# Patient Record
Sex: FEMALE | Race: WHITE | Employment: UNEMPLOYED | URBAN - METROPOLITAN AREA
[De-identification: names, ages, dates, MRNs, and addresses within clinical notes are randomized per-mention and may not be internally consistent; named-entity substitution may affect disease eponyms.]

---

## 2021-03-26 LAB — PAP SMEAR, EXTERNAL: NORMAL

## 2022-07-06 LAB — MAMMOGRAPHY, EXTERNAL: NORMAL

## 2023-11-10 SDOH — HEALTH STABILITY: PHYSICAL HEALTH: ON AVERAGE, HOW MANY MINUTES DO YOU ENGAGE IN EXERCISE AT THIS LEVEL?: 60 MIN

## 2023-11-10 SDOH — HEALTH STABILITY: PHYSICAL HEALTH: ON AVERAGE, HOW MANY DAYS PER WEEK DO YOU ENGAGE IN MODERATE TO STRENUOUS EXERCISE (LIKE A BRISK WALK)?: 7 DAYS

## 2023-11-13 ENCOUNTER — OFFICE VISIT (OUTPATIENT)
Dept: FAMILY MEDICINE CLINIC | Age: 48
End: 2023-11-13
Payer: COMMERCIAL

## 2023-11-13 VITALS
WEIGHT: 138.2 LBS | HEIGHT: 66 IN | BODY MASS INDEX: 22.21 KG/M2 | RESPIRATION RATE: 18 BRPM | SYSTOLIC BLOOD PRESSURE: 110 MMHG | OXYGEN SATURATION: 98 % | HEART RATE: 69 BPM | DIASTOLIC BLOOD PRESSURE: 72 MMHG

## 2023-11-13 DIAGNOSIS — Z00.00 ANNUAL PHYSICAL EXAM: Primary | ICD-10-CM

## 2023-11-13 DIAGNOSIS — Z12.11 COLON CANCER SCREENING: ICD-10-CM

## 2023-11-13 PROCEDURE — 90471 IMMUNIZATION ADMIN: CPT | Performed by: PHYSICIAN ASSISTANT

## 2023-11-13 PROCEDURE — 90674 CCIIV4 VAC NO PRSV 0.5 ML IM: CPT | Performed by: PHYSICIAN ASSISTANT

## 2023-11-13 PROCEDURE — 99386 PREV VISIT NEW AGE 40-64: CPT | Performed by: PHYSICIAN ASSISTANT

## 2023-11-13 RX ORDER — LEVOCETIRIZINE DIHYDROCHLORIDE 5 MG/1
2.5 TABLET, FILM COATED ORAL NIGHTLY
COMMUNITY

## 2023-11-13 RX ORDER — M-VIT,TX,IRON,MINS/CALC/FOLIC 27MG-0.4MG
1 TABLET ORAL DAILY
COMMUNITY

## 2023-11-13 SDOH — ECONOMIC STABILITY: FOOD INSECURITY: WITHIN THE PAST 12 MONTHS, YOU WORRIED THAT YOUR FOOD WOULD RUN OUT BEFORE YOU GOT MONEY TO BUY MORE.: NEVER TRUE

## 2023-11-13 SDOH — ECONOMIC STABILITY: INCOME INSECURITY: HOW HARD IS IT FOR YOU TO PAY FOR THE VERY BASICS LIKE FOOD, HOUSING, MEDICAL CARE, AND HEATING?: NOT VERY HARD

## 2023-11-13 SDOH — ECONOMIC STABILITY: FOOD INSECURITY: WITHIN THE PAST 12 MONTHS, THE FOOD YOU BOUGHT JUST DIDN'T LAST AND YOU DIDN'T HAVE MONEY TO GET MORE.: NEVER TRUE

## 2023-11-13 SDOH — ECONOMIC STABILITY: HOUSING INSECURITY
IN THE LAST 12 MONTHS, WAS THERE A TIME WHEN YOU DID NOT HAVE A STEADY PLACE TO SLEEP OR SLEPT IN A SHELTER (INCLUDING NOW)?: NO

## 2023-11-13 ASSESSMENT — PATIENT HEALTH QUESTIONNAIRE - PHQ9
SUM OF ALL RESPONSES TO PHQ9 QUESTIONS 1 & 2: 0
10. IF YOU CHECKED OFF ANY PROBLEMS, HOW DIFFICULT HAVE THESE PROBLEMS MADE IT FOR YOU TO DO YOUR WORK, TAKE CARE OF THINGS AT HOME, OR GET ALONG WITH OTHER PEOPLE: 0
7. TROUBLE CONCENTRATING ON THINGS, SUCH AS READING THE NEWSPAPER OR WATCHING TELEVISION: 0
4. FEELING TIRED OR HAVING LITTLE ENERGY: 0
3. TROUBLE FALLING OR STAYING ASLEEP: 0
6. FEELING BAD ABOUT YOURSELF - OR THAT YOU ARE A FAILURE OR HAVE LET YOURSELF OR YOUR FAMILY DOWN: 0
SUM OF ALL RESPONSES TO PHQ QUESTIONS 1-9: 0
1. LITTLE INTEREST OR PLEASURE IN DOING THINGS: 0
8. MOVING OR SPEAKING SO SLOWLY THAT OTHER PEOPLE COULD HAVE NOTICED. OR THE OPPOSITE, BEING SO FIGETY OR RESTLESS THAT YOU HAVE BEEN MOVING AROUND A LOT MORE THAN USUAL: 0
9. THOUGHTS THAT YOU WOULD BE BETTER OFF DEAD, OR OF HURTING YOURSELF: 0
SUM OF ALL RESPONSES TO PHQ QUESTIONS 1-9: 0
5. POOR APPETITE OR OVEREATING: 0
SUM OF ALL RESPONSES TO PHQ QUESTIONS 1-9: 0
SUM OF ALL RESPONSES TO PHQ QUESTIONS 1-9: 0
2. FEELING DOWN, DEPRESSED OR HOPELESS: 0

## 2023-11-13 NOTE — PROGRESS NOTES
range of motion, no synovitis. She exhibits no edema. Neurological: She is alert and oriented to person, place, and time. She has normal reflexes. No cranial nerve deficit. Coordination normal.   Skin: Skin is warm and dry. There is no rash or erythema. No suspicious lesions noted. Psychiatric: She has a normal mood and affect. Her speech is normal and behavior is normal. Judgment, cognition and memory are normal.           Assessment/Plan:         Diagnosis Orders   1. Annual physical exam  CBC    Lipid Panel    Comprehensive Metabolic Panel    Hemoglobin A1C      2.  Colon cancer screening  Fecal DNA Colorectal cancer screening (Cologuard)

## 2023-11-17 ENCOUNTER — NURSE ONLY (OUTPATIENT)
Dept: FAMILY MEDICINE CLINIC | Age: 48
End: 2023-11-17
Payer: COMMERCIAL

## 2023-11-17 DIAGNOSIS — Z00.00 ANNUAL PHYSICAL EXAM: ICD-10-CM

## 2023-11-17 LAB
ALBUMIN SERPL-MCNC: 4.7 G/DL (ref 3.4–5)
ALBUMIN/GLOB SERPL: 2.2 {RATIO} (ref 1.1–2.2)
ALP SERPL-CCNC: 57 U/L (ref 40–129)
ALT SERPL-CCNC: 21 U/L (ref 10–40)
ANION GAP SERPL CALCULATED.3IONS-SCNC: 10 MMOL/L (ref 3–16)
AST SERPL-CCNC: 21 U/L (ref 15–37)
BILIRUB SERPL-MCNC: 0.5 MG/DL (ref 0–1)
BUN SERPL-MCNC: 23 MG/DL (ref 7–20)
CALCIUM SERPL-MCNC: 9.7 MG/DL (ref 8.3–10.6)
CHLORIDE SERPL-SCNC: 103 MMOL/L (ref 99–110)
CHOLEST SERPL-MCNC: 162 MG/DL (ref 0–199)
CO2 SERPL-SCNC: 27 MMOL/L (ref 21–32)
CREAT SERPL-MCNC: 0.6 MG/DL (ref 0.6–1.1)
DEPRECATED RDW RBC AUTO: 13.8 % (ref 12.4–15.4)
GFR SERPLBLD CREATININE-BSD FMLA CKD-EPI: >60 ML/MIN/{1.73_M2}
GLUCOSE SERPL-MCNC: 92 MG/DL (ref 70–99)
HCT VFR BLD AUTO: 39.2 % (ref 36–48)
HDLC SERPL-MCNC: 87 MG/DL (ref 40–60)
HGB BLD-MCNC: 13 G/DL (ref 12–16)
LDLC SERPL CALC-MCNC: 69 MG/DL
MCH RBC QN AUTO: 31.7 PG (ref 26–34)
MCHC RBC AUTO-ENTMCNC: 33.3 G/DL (ref 31–36)
MCV RBC AUTO: 95.4 FL (ref 80–100)
PLATELET # BLD AUTO: 285 K/UL (ref 135–450)
PMV BLD AUTO: 8.8 FL (ref 5–10.5)
POTASSIUM SERPL-SCNC: 4.3 MMOL/L (ref 3.5–5.1)
PROT SERPL-MCNC: 6.8 G/DL (ref 6.4–8.2)
RBC # BLD AUTO: 4.11 M/UL (ref 4–5.2)
SODIUM SERPL-SCNC: 140 MMOL/L (ref 136–145)
TRIGL SERPL-MCNC: 32 MG/DL (ref 0–150)
VLDLC SERPL CALC-MCNC: 6 MG/DL
WBC # BLD AUTO: 4.5 K/UL (ref 4–11)

## 2023-11-17 PROCEDURE — 36415 COLL VENOUS BLD VENIPUNCTURE: CPT | Performed by: PHYSICIAN ASSISTANT

## 2023-11-18 LAB
EST. AVERAGE GLUCOSE BLD GHB EST-MCNC: 111.2 MG/DL
HBA1C MFR BLD: 5.5 %

## 2023-12-06 LAB — NONINV COLON CA DNA+OCC BLD SCRN STL QL: NEGATIVE

## 2023-12-27 ENCOUNTER — OFFICE VISIT (OUTPATIENT)
Dept: FAMILY MEDICINE CLINIC | Age: 48
End: 2023-12-27
Payer: COMMERCIAL

## 2023-12-27 VITALS
TEMPERATURE: 98 F | WEIGHT: 140.2 LBS | SYSTOLIC BLOOD PRESSURE: 106 MMHG | OXYGEN SATURATION: 98 % | DIASTOLIC BLOOD PRESSURE: 58 MMHG | RESPIRATION RATE: 16 BRPM | HEART RATE: 83 BPM | HEIGHT: 66 IN | BODY MASS INDEX: 22.53 KG/M2

## 2023-12-27 DIAGNOSIS — B96.89 ACUTE BACTERIAL SINUSITIS: Primary | ICD-10-CM

## 2023-12-27 DIAGNOSIS — J01.90 ACUTE BACTERIAL SINUSITIS: Primary | ICD-10-CM

## 2023-12-27 DIAGNOSIS — H05.222: ICD-10-CM

## 2023-12-27 PROCEDURE — 99213 OFFICE O/P EST LOW 20 MIN: CPT | Performed by: PHYSICIAN ASSISTANT

## 2023-12-27 RX ORDER — AMOXICILLIN AND CLAVULANATE POTASSIUM 875; 125 MG/1; MG/1
1 TABLET, FILM COATED ORAL 2 TIMES DAILY
Qty: 14 TABLET | Refills: 0 | Status: SHIPPED | OUTPATIENT
Start: 2023-12-27 | End: 2024-01-03

## 2023-12-27 RX ORDER — MOXIFLOXACIN 5 MG/ML
1 SOLUTION/ DROPS OPHTHALMIC 3 TIMES DAILY
Qty: 1 EACH | Refills: 0 | Status: SHIPPED | OUTPATIENT
Start: 2023-12-27 | End: 2024-01-03

## 2024-01-03 ENCOUNTER — OFFICE VISIT (OUTPATIENT)
Dept: OBGYN CLINIC | Age: 49
End: 2024-01-03
Payer: COMMERCIAL

## 2024-01-03 VITALS
HEIGHT: 66 IN | SYSTOLIC BLOOD PRESSURE: 119 MMHG | TEMPERATURE: 97.8 F | WEIGHT: 140.6 LBS | BODY MASS INDEX: 22.6 KG/M2 | OXYGEN SATURATION: 98 % | DIASTOLIC BLOOD PRESSURE: 68 MMHG | HEART RATE: 78 BPM

## 2024-01-03 DIAGNOSIS — Z12.4 SCREENING FOR CERVICAL CANCER: ICD-10-CM

## 2024-01-03 DIAGNOSIS — N63.14 MASS OF LOWER INNER QUADRANT OF RIGHT BREAST: ICD-10-CM

## 2024-01-03 DIAGNOSIS — Z01.419 WOMEN'S ANNUAL ROUTINE GYNECOLOGICAL EXAMINATION: Primary | ICD-10-CM

## 2024-01-03 PROCEDURE — 99396 PREV VISIT EST AGE 40-64: CPT | Performed by: OBSTETRICS & GYNECOLOGY

## 2024-01-03 ASSESSMENT — ENCOUNTER SYMPTOMS
BACK PAIN: 0
BLOOD IN STOOL: 0
DIARRHEA: 0
CONSTIPATION: 0

## 2024-01-03 NOTE — PROGRESS NOTES
[facility] Ob/Gyn   Annual Exam      CC:   Chief Complaint   Patient presents with    New Patient     New Patient: Establish care: Last pap . No hx of abnormal        HPI:  48 y.o.  LMP 2023 presents for her gynecologic annual exam. She reports that she had four cycles over the past year. She reports that she is having hot flashes approximately 40 times a day as well as occasional night sweats.     Primary Care Physician: Beba Juarez PA    Obstetric History  OB History    Para Term  AB Living   2 2 2     2   SAB IAB Ectopic Molar Multiple Live Births             2      # Outcome Date GA Lbr Kayode/2nd Weight Sex Delivery Anes PTL Lv   2 Term      Vag-Spont   JESSICA   1 Term      Vag-Spont   JESSICA       Gynecologic History  Menstrual History:  Menarche: 9   LMP: 2025  Menstrual Period: irregular  Interval Between Menses: ranges   Duration of Menses: five to seven days   Menstrual Flow: normal   Bleeding between menses: denies   Pain: denies       Screening:  Last pap smear: ASCUS in   History of abnormal pap smears: denies   STI hx: denies   Mammogram:   Colonoscopy: cologuard    Medical History:  Past Medical History:   Diagnosis Date    Allergic rhinitis     Anxiety     Mild situational       Surgical History:  No past surgical history on file.    Medications:  Current Outpatient Medications   Medication Sig Dispense Refill    levocetirizine (XYZAL ALLERGY 24HR) 5 MG tablet Take 0.5 tablets by mouth nightly      Multiple Vitamins-Minerals (THERAPEUTIC MULTIVITAMIN-MINERALS) tablet Take 1 tablet by mouth daily       No current facility-administered medications for this visit.       Allergies:  Allergies   Allergen Reactions    Codeine Other (See Comments)    Tetracycline Diarrhea, Hives and Nausea And Vomiting       Family History:  Family History   Problem Relation Age of Onset    Asthma Mother     Breast Cancer Mother     Allergy (Severe) Father     Hearing Loss Father

## 2024-01-04 LAB — HPV16+18+H RISK 12 DNA SPEC-IMP: NORMAL

## 2024-01-10 LAB
HPV HR 12 DNA SPEC QL NAA+PROBE: NOT DETECTED
HPV16 DNA SPEC QL NAA+PROBE: NOT DETECTED
HPV16+18+H RISK 12 DNA SPEC-IMP: NORMAL
HPV18 DNA SPEC QL NAA+PROBE: NOT DETECTED

## 2024-01-30 ENCOUNTER — HOSPITAL ENCOUNTER (OUTPATIENT)
Dept: WOMENS IMAGING | Age: 49
Discharge: HOME | End: 2024-01-30
Payer: COMMERCIAL

## 2024-01-30 VITALS — WEIGHT: 138 LBS | BODY MASS INDEX: 22.18 KG/M2 | HEIGHT: 66 IN

## 2024-01-30 DIAGNOSIS — N63.14 MASS OF LOWER INNER QUADRANT OF RIGHT BREAST: ICD-10-CM

## 2024-01-30 PROCEDURE — G0279 TOMOSYNTHESIS, MAMMO: HCPCS

## 2024-01-30 PROCEDURE — 76642 ULTRASOUND BREAST LIMITED: CPT

## 2024-04-03 ENCOUNTER — PATIENT MESSAGE (OUTPATIENT)
Dept: FAMILY MEDICINE CLINIC | Age: 49
End: 2024-04-03

## 2024-04-03 NOTE — TELEPHONE ENCOUNTER
From: Shraddha Hanson  To: Beba Juarez  Sent: 4/3/2024 8:03 AM EDT  Subject: Appointment Request    Appointment Request From: Shraddha Hanson    With Provider: KT Guerrero [[facility]]    Preferred Date Range: 4/3/2024 – 4/3/2024    Preferred Times: Wednesday Morning, Wednesday Afternoon    Reason for visit: Office Visit    Comments:  Stye that won’t go away. Need antibiotic. Can’t do after 3:30 today.

## 2024-04-04 ENCOUNTER — OFFICE VISIT (OUTPATIENT)
Dept: FAMILY MEDICINE CLINIC | Age: 49
End: 2024-04-04
Payer: COMMERCIAL

## 2024-04-04 VITALS
RESPIRATION RATE: 16 BRPM | SYSTOLIC BLOOD PRESSURE: 98 MMHG | BODY MASS INDEX: 22.24 KG/M2 | HEART RATE: 82 BPM | OXYGEN SATURATION: 98 % | WEIGHT: 138.4 LBS | HEIGHT: 66 IN | DIASTOLIC BLOOD PRESSURE: 60 MMHG | TEMPERATURE: 97.6 F

## 2024-04-04 DIAGNOSIS — G47.01 INSOMNIA DUE TO MEDICAL CONDITION: ICD-10-CM

## 2024-04-04 DIAGNOSIS — H00.021 HORDEOLUM INTERNUM OF RIGHT UPPER EYELID: Primary | ICD-10-CM

## 2024-04-04 PROBLEM — E55.9 VITAMIN D DEFICIENCY: Status: ACTIVE | Noted: 2021-06-30

## 2024-04-04 PROCEDURE — 99214 OFFICE O/P EST MOD 30 MIN: CPT | Performed by: PHYSICIAN ASSISTANT

## 2024-04-04 RX ORDER — ERYTHROMYCIN 5 MG/G
1 OINTMENT OPHTHALMIC
Qty: 1 EACH | Refills: 0 | Status: SHIPPED | OUTPATIENT
Start: 2024-04-04 | End: 2024-04-11

## 2024-04-04 RX ORDER — HYDROXYZINE PAMOATE 50 MG/1
50 CAPSULE ORAL 2 TIMES DAILY PRN
Qty: 15 CAPSULE | Refills: 0 | Status: SHIPPED | OUTPATIENT
Start: 2024-04-04

## 2024-04-04 ASSESSMENT — PATIENT HEALTH QUESTIONNAIRE - PHQ9
2. FEELING DOWN, DEPRESSED OR HOPELESS: NOT AT ALL
SUM OF ALL RESPONSES TO PHQ QUESTIONS 1-9: 0
SUM OF ALL RESPONSES TO PHQ9 QUESTIONS 1 & 2: 0
SUM OF ALL RESPONSES TO PHQ9 QUESTIONS 1 & 2: 0
1. LITTLE INTEREST OR PLEASURE IN DOING THINGS: NOT AT ALL
SUM OF ALL RESPONSES TO PHQ QUESTIONS 1-9: 0
1. LITTLE INTEREST OR PLEASURE IN DOING THINGS: NOT AT ALL
2. FEELING DOWN, DEPRESSED OR HOPELESS: NOT AT ALL

## 2024-04-04 NOTE — PROGRESS NOTES
[facility] MEDICINE  04/04/24       IMPRESSION/ PLAN     Hordeolum internum of right upper eyelid  -Right upper outer lid stye.  Begin erythromycin ointment x 7 days, warm compresses, avoid contact use until resolved.    Insomnia due to medical condition  -Under excess stressors would be interested in SSRI.  Has already seen a therapist  -Vistaril 50 mg nightly as needed, do not take with Xyzal.  Follow-up in 3 weeks with PCP to discuss daily treatment for depression/anxiety. Would be interested in SSRI use.        CHIEF COMPLAINT  Chief Complaint   Patient presents with    Stye     Pt is here with a stye on her right eye     HISTORY OF PRESENT  ILLNESS  Shraddha Hanson is a 48 y.o.  female   began with an erythemic bulge upper outer right eyelid 10 days ago unresolved despite warm compresses.  Has had them in the past.  Wears contacts.  No other respiratory symptoms.    Also under excess stressors and having more insomnia.  Tried melatonin but no longer working.  Has seen therapist in the past.  Would be interested in beginning daily SSRI.    ROS:  Remaining reviewed and are unremarkable for other constitutional, EENT, cardiac, pulmonary, GI, , neurologic, musculoskeletal, or integumentary complaints.      PAST MEDICAL/SURGICAL, SOCIAL, &  FAMILY HISTORY:  Reviewed and updated accordingly.     ALLERGIES : Codeine and Tetracycline    MEDICATIONS:  Current Outpatient Medications on File Prior to Visit   Medication Sig Dispense Refill    levocetirizine (XYZAL ALLERGY 24HR) 5 MG tablet Take 0.5 tablets by mouth nightly      Multiple Vitamins-Minerals (THERAPEUTIC MULTIVITAMIN-MINERALS) tablet Take 1 tablet by mouth daily       No current facility-administered medications on file prior to visit.        PHYSICAL EXAM     Vitals:    04/04/24 0800   BP: 98/60   Pulse: 82   Resp: 16   Temp: 97.6 °F (36.4 °C)   TempSrc: Temporal   SpO2: 98%   Weight: 62.8 kg (138 lb 6.4 oz)   Height: 1.676 m (5' 6\")

## 2025-02-05 ENCOUNTER — OFFICE VISIT (OUTPATIENT)
Dept: FAMILY MEDICINE CLINIC | Age: 50
End: 2025-02-05

## 2025-02-05 ENCOUNTER — PATIENT MESSAGE (OUTPATIENT)
Dept: FAMILY MEDICINE CLINIC | Age: 50
End: 2025-02-05

## 2025-02-05 VITALS
SYSTOLIC BLOOD PRESSURE: 102 MMHG | BODY MASS INDEX: 21.08 KG/M2 | WEIGHT: 131.2 LBS | RESPIRATION RATE: 14 BRPM | HEART RATE: 99 BPM | HEIGHT: 66 IN | TEMPERATURE: 98.2 F | DIASTOLIC BLOOD PRESSURE: 60 MMHG | OXYGEN SATURATION: 100 %

## 2025-02-05 DIAGNOSIS — R39.15 URGENCY OF URINATION: ICD-10-CM

## 2025-02-05 DIAGNOSIS — R30.0 DYSURIA: ICD-10-CM

## 2025-02-05 DIAGNOSIS — N39.0 RECURRENT UTI: Primary | ICD-10-CM

## 2025-02-05 LAB
BILIRUBIN, POC: NORMAL
BLOOD URINE, POC: NORMAL
CLARITY, POC: CLEAR
COLOR, POC: NORMAL
GLUCOSE URINE, POC: 100 MG/DL
KETONES, POC: 40 MG/DL
LEUKOCYTE EST, POC: NEGATIVE
NITRITE, POC: POSITIVE
PH, POC: 5
PROTEIN, POC: NEGATIVE MG/DL
SPECIFIC GRAVITY, POC: 1.02
UROBILINOGEN, POC: 1 MG/DL

## 2025-02-05 RX ORDER — PHENAZOPYRIDINE HYDROCHLORIDE 100 MG/1
100 TABLET, FILM COATED ORAL 3 TIMES DAILY PRN
Qty: 6 TABLET | Refills: 0 | Status: SHIPPED | OUTPATIENT
Start: 2025-02-05

## 2025-02-05 RX ORDER — CIPROFLOXACIN 500 MG/1
500 TABLET, FILM COATED ORAL 2 TIMES DAILY
Qty: 10 TABLET | Refills: 0 | Status: SHIPPED | OUTPATIENT
Start: 2025-02-05 | End: 2025-02-10

## 2025-02-05 SDOH — ECONOMIC STABILITY: FOOD INSECURITY: WITHIN THE PAST 12 MONTHS, THE FOOD YOU BOUGHT JUST DIDN'T LAST AND YOU DIDN'T HAVE MONEY TO GET MORE.: NEVER TRUE

## 2025-02-05 SDOH — ECONOMIC STABILITY: FOOD INSECURITY: WITHIN THE PAST 12 MONTHS, YOU WORRIED THAT YOUR FOOD WOULD RUN OUT BEFORE YOU GOT MONEY TO BUY MORE.: NEVER TRUE

## 2025-02-05 ASSESSMENT — PATIENT HEALTH QUESTIONNAIRE - PHQ9
2. FEELING DOWN, DEPRESSED OR HOPELESS: NOT AT ALL
10. IF YOU CHECKED OFF ANY PROBLEMS, HOW DIFFICULT HAVE THESE PROBLEMS MADE IT FOR YOU TO DO YOUR WORK, TAKE CARE OF THINGS AT HOME, OR GET ALONG WITH OTHER PEOPLE: NOT DIFFICULT AT ALL
SUM OF ALL RESPONSES TO PHQ QUESTIONS 1-9: 0
6. FEELING BAD ABOUT YOURSELF - OR THAT YOU ARE A FAILURE OR HAVE LET YOURSELF OR YOUR FAMILY DOWN: NOT AT ALL
1. LITTLE INTEREST OR PLEASURE IN DOING THINGS: NOT AT ALL
5. POOR APPETITE OR OVEREATING: NOT AT ALL
9. THOUGHTS THAT YOU WOULD BE BETTER OFF DEAD, OR OF HURTING YOURSELF: NOT AT ALL
SUM OF ALL RESPONSES TO PHQ9 QUESTIONS 1 & 2: 0
3. TROUBLE FALLING OR STAYING ASLEEP: NOT AT ALL
7. TROUBLE CONCENTRATING ON THINGS, SUCH AS READING THE NEWSPAPER OR WATCHING TELEVISION: NOT AT ALL
SUM OF ALL RESPONSES TO PHQ QUESTIONS 1-9: 0
8. MOVING OR SPEAKING SO SLOWLY THAT OTHER PEOPLE COULD HAVE NOTICED. OR THE OPPOSITE, BEING SO FIGETY OR RESTLESS THAT YOU HAVE BEEN MOVING AROUND A LOT MORE THAN USUAL: NOT AT ALL
4. FEELING TIRED OR HAVING LITTLE ENERGY: NOT AT ALL

## 2025-02-05 NOTE — PROGRESS NOTES
[facility]    CC:   Chief Complaint   Patient presents with    Dysuria     Pelvic pain, urgency x 11 days       History of Present Illness:    Shraddha Hanson is a 49 y.o. female who complains of 10 days of  dysuria, frequency, urgency, burning with urination, and not feeling well.   Telehealth appointment prescribed Bactrim x 10 days completed yesterday. Still using OTC azo.   Last  UTI- 2 years.       Denies:  Fever, chills, flank or suprapubic pains. No nausea. Appetite unchanged.   Denies GYN changes.   Denies bowel habit changes, melena, hematochezia.      Physical exam:      Vitals:    02/05/25 1403   BP: 102/60   Pulse: 99   Resp: 14   Temp: 98.2 °F (36.8 °C)   TempSrc: Temporal   SpO2: 100%   Weight: 59.5 kg (131 lb 3.2 oz)   Height: 1.676 m (5' 6\")     APPEARANCE:    No acute distress.looks like she does not feel well.  HEART: Reg rate and rhythm. No murmurs, rubs, or gallops.   LUNGS: Clear to auscultation. No wheezes, rales, or rhonchi.  ABDOMEN:  Soft, bowel sounds present,  non-tender, no masses or HSM. No CVAT.  NEUROLOGIC: grossly non focal  SKIN: Warm, dry, normal turgor. Cap refill <3secs.  No rashes, petechiae, purpura.     Laboratory:   Results for orders placed or performed in visit on 02/05/25   POCT Urinalysis no Micro   Result Value Ref Range    Color, UA orange     Clarity, UA clear     Glucose, UA  mg/dL    Bilirubin, UA Small     Ketones, UA 40 mg/dL    Spec Grav, UA 1.025     Blood, UA POC Trace-intact     pH, UA 5.0     Protein, UA POC Negative mg/dL    Urobilinogen, UA 1.0 mg/dL    Leukocytes, UA Negative     Nitrite, UA Positive       Urine culture will defer as just completed antibiotic.     Assessment/ Plan      1. Recurrent UTI    2. Dysuria    3. Urgency of urination     Refractory to Bactrim. Start Cipro x 5d.   Use prescribed Pyridium over Azo.  Increase fluids.   RTO 1 week    Orders Placed This Encounter   Medications    ciprofloxacin (CIPRO)

## 2025-02-08 ENCOUNTER — HOSPITAL ENCOUNTER (INPATIENT)
Age: 50
LOS: 1 days | Discharge: HOME | DRG: 690 | End: 2025-02-09
Attending: STUDENT IN AN ORGANIZED HEALTH CARE EDUCATION/TRAINING PROGRAM | Admitting: INTERNAL MEDICINE
Payer: COMMERCIAL

## 2025-02-08 DIAGNOSIS — N39.0 URINARY TRACT INFECTION WITHOUT HEMATURIA, SITE UNSPECIFIED: Primary | ICD-10-CM

## 2025-02-08 LAB
ALBUMIN SERPL-MCNC: 4.4 G/DL (ref 3.4–5)
ANION GAP SERPL CALCULATED.3IONS-SCNC: 13 MMOL/L (ref 3–16)
BACTERIA URNS QL MICRO: ABNORMAL /HPF
BASOPHILS # BLD: 0 K/UL (ref 0–0.2)
BASOPHILS NFR BLD: 0.6 %
BILIRUB UR QL STRIP.AUTO: ABNORMAL
BUN SERPL-MCNC: 17 MG/DL (ref 7–20)
CALCIUM SERPL-MCNC: 9.4 MG/DL (ref 8.3–10.6)
CHLORIDE SERPL-SCNC: 101 MMOL/L (ref 99–110)
CLARITY UR: CLEAR
CO2 SERPL-SCNC: 22 MMOL/L (ref 21–32)
COLOR UR: ABNORMAL
CREAT SERPL-MCNC: 0.6 MG/DL (ref 0.6–1.1)
DEPRECATED RDW RBC AUTO: 13.1 % (ref 12.4–15.4)
EOSINOPHIL # BLD: 0 K/UL (ref 0–0.6)
EOSINOPHIL NFR BLD: 0.9 %
EPI CELLS #/AREA URNS HPF: ABNORMAL /HPF (ref 0–5)
GFR SERPLBLD CREATININE-BSD FMLA CKD-EPI: >90 ML/MIN/{1.73_M2}
GLUCOSE SERPL-MCNC: 106 MG/DL (ref 70–99)
GLUCOSE UR STRIP.AUTO-MCNC: 100 MG/DL
HCT VFR BLD AUTO: 37.3 % (ref 36–48)
HGB BLD-MCNC: 12.2 G/DL (ref 12–16)
HGB UR QL STRIP.AUTO: NEGATIVE
KETONES UR STRIP.AUTO-MCNC: >=80 MG/DL
LEUKOCYTE ESTERASE UR QL STRIP.AUTO: NEGATIVE
LYMPHOCYTES # BLD: 1.2 K/UL (ref 1–5.1)
LYMPHOCYTES NFR BLD: 21.6 %
MCH RBC QN AUTO: 32.1 PG (ref 26–34)
MCHC RBC AUTO-ENTMCNC: 32.6 G/DL (ref 31–36)
MCV RBC AUTO: 98.4 FL (ref 80–100)
MONOCYTES # BLD: 0.3 K/UL (ref 0–1.3)
MONOCYTES NFR BLD: 6.3 %
NEUTROPHILS # BLD: 3.8 K/UL (ref 1.7–7.7)
NEUTROPHILS NFR BLD: 70.6 %
NITRITE UR QL STRIP.AUTO: POSITIVE
PH UR STRIP.AUTO: 5.5 [PH] (ref 5–8)
PHOSPHATE SERPL-MCNC: 3 MG/DL (ref 2.5–4.9)
PLATELET # BLD AUTO: 293 K/UL (ref 135–450)
PMV BLD AUTO: 7.6 FL (ref 5–10.5)
POTASSIUM SERPL-SCNC: 3.7 MMOL/L (ref 3.5–5.1)
PROT UR STRIP.AUTO-MCNC: 30 MG/DL
RBC # BLD AUTO: 3.79 M/UL (ref 4–5.2)
RBC #/AREA URNS HPF: ABNORMAL /HPF (ref 0–4)
SODIUM SERPL-SCNC: 136 MMOL/L (ref 136–145)
SP GR UR STRIP.AUTO: 1.02 (ref 1–1.03)
UA DIPSTICK W REFLEX MICRO PNL UR: YES
URN SPEC COLLECT METH UR: ABNORMAL
UROBILINOGEN UR STRIP-ACNC: 4 E.U./DL
WBC # BLD AUTO: 5.4 K/UL (ref 4–11)
WBC #/AREA URNS HPF: ABNORMAL /HPF (ref 0–5)

## 2025-02-08 PROCEDURE — 6360000002 HC RX W HCPCS: Performed by: STUDENT IN AN ORGANIZED HEALTH CARE EDUCATION/TRAINING PROGRAM

## 2025-02-08 PROCEDURE — 96375 TX/PRO/DX INJ NEW DRUG ADDON: CPT

## 2025-02-08 PROCEDURE — 2500000003 HC RX 250 WO HCPCS: Performed by: INTERNAL MEDICINE

## 2025-02-08 PROCEDURE — 81001 URINALYSIS AUTO W/SCOPE: CPT

## 2025-02-08 PROCEDURE — 96374 THER/PROPH/DIAG INJ IV PUSH: CPT

## 2025-02-08 PROCEDURE — 80069 RENAL FUNCTION PANEL: CPT

## 2025-02-08 PROCEDURE — 2580000003 HC RX 258: Performed by: INTERNAL MEDICINE

## 2025-02-08 PROCEDURE — 2580000003 HC RX 258: Performed by: STUDENT IN AN ORGANIZED HEALTH CARE EDUCATION/TRAINING PROGRAM

## 2025-02-08 PROCEDURE — 99285 EMERGENCY DEPT VISIT HI MDM: CPT

## 2025-02-08 PROCEDURE — 85025 COMPLETE CBC W/AUTO DIFF WBC: CPT

## 2025-02-08 PROCEDURE — 1200000000 HC SEMI PRIVATE

## 2025-02-08 PROCEDURE — 2500000003 HC RX 250 WO HCPCS: Performed by: STUDENT IN AN ORGANIZED HEALTH CARE EDUCATION/TRAINING PROGRAM

## 2025-02-08 PROCEDURE — 96361 HYDRATE IV INFUSION ADD-ON: CPT

## 2025-02-08 RX ORDER — POTASSIUM CHLORIDE 1500 MG/1
40 TABLET, EXTENDED RELEASE ORAL PRN
Status: DISCONTINUED | OUTPATIENT
Start: 2025-02-08 | End: 2025-02-09 | Stop reason: HOSPADM

## 2025-02-08 RX ORDER — SODIUM CHLORIDE 0.9 % (FLUSH) 0.9 %
5-40 SYRINGE (ML) INJECTION PRN
Status: DISCONTINUED | OUTPATIENT
Start: 2025-02-08 | End: 2025-02-09 | Stop reason: HOSPADM

## 2025-02-08 RX ORDER — SODIUM CHLORIDE 0.9 % (FLUSH) 0.9 %
5-40 SYRINGE (ML) INJECTION EVERY 12 HOURS SCHEDULED
Status: DISCONTINUED | OUTPATIENT
Start: 2025-02-08 | End: 2025-02-09 | Stop reason: HOSPADM

## 2025-02-08 RX ORDER — POLYETHYLENE GLYCOL 3350 17 G/17G
17 POWDER, FOR SOLUTION ORAL DAILY PRN
Status: DISCONTINUED | OUTPATIENT
Start: 2025-02-08 | End: 2025-02-09 | Stop reason: HOSPADM

## 2025-02-08 RX ORDER — MAGNESIUM SULFATE IN WATER 40 MG/ML
2000 INJECTION, SOLUTION INTRAVENOUS PRN
Status: DISCONTINUED | OUTPATIENT
Start: 2025-02-08 | End: 2025-02-09 | Stop reason: HOSPADM

## 2025-02-08 RX ORDER — ONDANSETRON 2 MG/ML
4 INJECTION INTRAMUSCULAR; INTRAVENOUS EVERY 6 HOURS PRN
Status: DISCONTINUED | OUTPATIENT
Start: 2025-02-08 | End: 2025-02-09 | Stop reason: HOSPADM

## 2025-02-08 RX ORDER — PHENAZOPYRIDINE HYDROCHLORIDE 100 MG/1
100 TABLET, FILM COATED ORAL 3 TIMES DAILY PRN
Status: DISCONTINUED | OUTPATIENT
Start: 2025-02-08 | End: 2025-02-09 | Stop reason: HOSPADM

## 2025-02-08 RX ORDER — ACETAMINOPHEN 650 MG/1
650 SUPPOSITORY RECTAL EVERY 6 HOURS PRN
Status: DISCONTINUED | OUTPATIENT
Start: 2025-02-08 | End: 2025-02-09 | Stop reason: HOSPADM

## 2025-02-08 RX ORDER — CETIRIZINE HYDROCHLORIDE 10 MG/1
10 TABLET ORAL DAILY
Status: DISCONTINUED | OUTPATIENT
Start: 2025-02-09 | End: 2025-02-09 | Stop reason: HOSPADM

## 2025-02-08 RX ORDER — ACETAMINOPHEN 325 MG/1
650 TABLET ORAL EVERY 6 HOURS PRN
Status: DISCONTINUED | OUTPATIENT
Start: 2025-02-08 | End: 2025-02-09 | Stop reason: HOSPADM

## 2025-02-08 RX ORDER — ONDANSETRON 4 MG/1
4 TABLET, ORALLY DISINTEGRATING ORAL EVERY 8 HOURS PRN
Status: DISCONTINUED | OUTPATIENT
Start: 2025-02-08 | End: 2025-02-09 | Stop reason: HOSPADM

## 2025-02-08 RX ORDER — KETOROLAC TROMETHAMINE 30 MG/ML
15 INJECTION, SOLUTION INTRAMUSCULAR; INTRAVENOUS EVERY 6 HOURS PRN
Status: DISCONTINUED | OUTPATIENT
Start: 2025-02-08 | End: 2025-02-09 | Stop reason: HOSPADM

## 2025-02-08 RX ORDER — KETOROLAC TROMETHAMINE 30 MG/ML
15 INJECTION, SOLUTION INTRAMUSCULAR; INTRAVENOUS ONCE
Status: COMPLETED | OUTPATIENT
Start: 2025-02-08 | End: 2025-02-08

## 2025-02-08 RX ORDER — SODIUM CHLORIDE 9 MG/ML
INJECTION, SOLUTION INTRAVENOUS PRN
Status: DISCONTINUED | OUTPATIENT
Start: 2025-02-08 | End: 2025-02-09 | Stop reason: HOSPADM

## 2025-02-08 RX ORDER — ENOXAPARIN SODIUM 100 MG/ML
40 INJECTION SUBCUTANEOUS DAILY
Status: DISCONTINUED | OUTPATIENT
Start: 2025-02-09 | End: 2025-02-09 | Stop reason: HOSPADM

## 2025-02-08 RX ORDER — POTASSIUM CHLORIDE 7.45 MG/ML
10 INJECTION INTRAVENOUS PRN
Status: DISCONTINUED | OUTPATIENT
Start: 2025-02-08 | End: 2025-02-09 | Stop reason: HOSPADM

## 2025-02-08 RX ORDER — SODIUM CHLORIDE 9 MG/ML
INJECTION, SOLUTION INTRAVENOUS CONTINUOUS
Status: DISCONTINUED | OUTPATIENT
Start: 2025-02-08 | End: 2025-02-09 | Stop reason: HOSPADM

## 2025-02-08 RX ORDER — M-VIT,TX,IRON,MINS/CALC/FOLIC 27MG-0.4MG
1 TABLET ORAL DAILY
Status: DISCONTINUED | OUTPATIENT
Start: 2025-02-09 | End: 2025-02-09 | Stop reason: HOSPADM

## 2025-02-08 RX ORDER — SODIUM CHLORIDE, SODIUM LACTATE, POTASSIUM CHLORIDE, AND CALCIUM CHLORIDE .6; .31; .03; .02 G/100ML; G/100ML; G/100ML; G/100ML
1000 INJECTION, SOLUTION INTRAVENOUS ONCE
Status: COMPLETED | OUTPATIENT
Start: 2025-02-08 | End: 2025-02-08

## 2025-02-08 RX ADMIN — SODIUM CHLORIDE: 9 INJECTION, SOLUTION INTRAVENOUS at 23:28

## 2025-02-08 RX ADMIN — KETOROLAC TROMETHAMINE 15 MG: 30 INJECTION, SOLUTION INTRAMUSCULAR at 19:07

## 2025-02-08 RX ADMIN — SODIUM CHLORIDE, PRESERVATIVE FREE 10 ML: 5 INJECTION INTRAVENOUS at 23:26

## 2025-02-08 RX ADMIN — WATER 2000 MG: 1 INJECTION INTRAMUSCULAR; INTRAVENOUS; SUBCUTANEOUS at 21:36

## 2025-02-08 RX ADMIN — SODIUM CHLORIDE, POTASSIUM CHLORIDE, SODIUM LACTATE AND CALCIUM CHLORIDE 1000 ML: 600; 310; 30; 20 INJECTION, SOLUTION INTRAVENOUS at 19:07

## 2025-02-08 ASSESSMENT — PAIN SCALES - GENERAL
PAINLEVEL_OUTOF10: 5
PAINLEVEL_OUTOF10: 4
PAINLEVEL_OUTOF10: 7

## 2025-02-08 ASSESSMENT — PAIN - FUNCTIONAL ASSESSMENT: PAIN_FUNCTIONAL_ASSESSMENT: 0-10

## 2025-02-08 ASSESSMENT — PAIN DESCRIPTION - ONSET: ONSET: PROGRESSIVE

## 2025-02-08 ASSESSMENT — PAIN DESCRIPTION - LOCATION
LOCATION: ABDOMEN;BACK
LOCATION: ABDOMEN;GROIN;BACK
LOCATION: GROIN

## 2025-02-08 ASSESSMENT — PAIN DESCRIPTION - PAIN TYPE: TYPE: ACUTE PAIN

## 2025-02-08 ASSESSMENT — PAIN DESCRIPTION - FREQUENCY: FREQUENCY: INTERMITTENT

## 2025-02-08 ASSESSMENT — PAIN DESCRIPTION - DESCRIPTORS: DESCRIPTORS: DISCOMFORT;TENDER;SHARP

## 2025-02-08 ASSESSMENT — PAIN DESCRIPTION - ORIENTATION: ORIENTATION: ANTERIOR;MID;LOWER

## 2025-02-08 NOTE — ED PROVIDER NOTES
THE [facility]  EMERGENCY DEPARTMENT ENCOUNTER          EM RESIDENT NOTE       Date of evaluation: 2/8/2025    Chief Complaint     Abdominal Pain (C/o ongoing lower pubic area abdominal pain, Jan 26 tele health visit and given Bactrium x 10 days, then not all the way better and went to PCP on Wednesday and gave patient Cipro. Went to Urgent care today and sent here to r/o kidney stone, denies n/v/d or fevers ) and Back Pain    History of Present Illness     Shraddha Hanson is a 49 y.o. female with PMHx of recurrent UTI who presents with abdominal pain.    Patient was seen on 2/5/2025 and her primary care office for 10 days of dysuria, increased urinary urgency and frequency, and generalized illness. At the time, she had finished a 10-day course of Bactrim without any improvement of her symptoms. She was then prescribed ciprofloxacin, Pyridium, and told to increase her fluid intake. She the ciprofloxacin was initially helping her but she has now had recurrence of her lower abdominal pain that is primarily focused over her pubic bone. She also endorses diffuse pain in her abdomen and her back that has been going on during this entire infectious episode. Denies any fevers, chills, nausea, vomiting, or vaginal bleeding/discharge. She was seen at the urgent care earlier today and they recommend that she present to the emergency department for additional evaluation and possible stone evaluation. Patient denies any history of kidney stones and states that she is not having any flank pain that is radiating down into her pelvis.    Review of Systems     A complete ROS was performed and is negative unless stated above.     Past Medical, Surgical, Family, and Social History     She has a past medical history of Allergic rhinitis, Anxiety, and UTI (urinary tract infection).  She has no past surgical history on file.  Her family history includes Allergy (Severe) in her father; Alzheimer's Disease in her maternal

## 2025-02-09 ENCOUNTER — APPOINTMENT (OUTPATIENT)
Dept: CT IMAGING | Age: 50
DRG: 690 | End: 2025-02-09
Payer: COMMERCIAL

## 2025-02-09 VITALS
TEMPERATURE: 97.9 F | SYSTOLIC BLOOD PRESSURE: 105 MMHG | HEIGHT: 66 IN | DIASTOLIC BLOOD PRESSURE: 66 MMHG | WEIGHT: 132.28 LBS | OXYGEN SATURATION: 98 % | BODY MASS INDEX: 21.26 KG/M2 | RESPIRATION RATE: 16 BRPM | HEART RATE: 76 BPM

## 2025-02-09 LAB
ANION GAP SERPL CALCULATED.3IONS-SCNC: 9 MMOL/L (ref 3–16)
BASOPHILS # BLD: 0 K/UL (ref 0–0.2)
BASOPHILS NFR BLD: 0.9 %
BUN SERPL-MCNC: 14 MG/DL (ref 7–20)
CALCIUM SERPL-MCNC: 8.7 MG/DL (ref 8.3–10.6)
CHLORIDE SERPL-SCNC: 106 MMOL/L (ref 99–110)
CO2 SERPL-SCNC: 23 MMOL/L (ref 21–32)
CREAT SERPL-MCNC: 0.5 MG/DL (ref 0.6–1.1)
DEPRECATED RDW RBC AUTO: 12.9 % (ref 12.4–15.4)
EOSINOPHIL # BLD: 0.1 K/UL (ref 0–0.6)
EOSINOPHIL NFR BLD: 2.3 %
GFR SERPLBLD CREATININE-BSD FMLA CKD-EPI: >90 ML/MIN/{1.73_M2}
GLUCOSE SERPL-MCNC: 150 MG/DL (ref 70–99)
HCT VFR BLD AUTO: 34.2 % (ref 36–48)
HGB BLD-MCNC: 11.4 G/DL (ref 12–16)
LYMPHOCYTES # BLD: 1.1 K/UL (ref 1–5.1)
LYMPHOCYTES NFR BLD: 29.6 %
MCH RBC QN AUTO: 32.4 PG (ref 26–34)
MCHC RBC AUTO-ENTMCNC: 33.2 G/DL (ref 31–36)
MCV RBC AUTO: 97.6 FL (ref 80–100)
MONOCYTES # BLD: 0.3 K/UL (ref 0–1.3)
MONOCYTES NFR BLD: 8.9 %
NEUTROPHILS # BLD: 2.1 K/UL (ref 1.7–7.7)
NEUTROPHILS NFR BLD: 58.3 %
PLATELET # BLD AUTO: 240 K/UL (ref 135–450)
PMV BLD AUTO: 7.5 FL (ref 5–10.5)
POTASSIUM SERPL-SCNC: 3.7 MMOL/L (ref 3.5–5.1)
RBC # BLD AUTO: 3.5 M/UL (ref 4–5.2)
SODIUM SERPL-SCNC: 138 MMOL/L (ref 136–145)
WBC # BLD AUTO: 3.6 K/UL (ref 4–11)

## 2025-02-09 PROCEDURE — 87086 URINE CULTURE/COLONY COUNT: CPT

## 2025-02-09 PROCEDURE — 85025 COMPLETE CBC W/AUTO DIFF WBC: CPT

## 2025-02-09 PROCEDURE — 74177 CT ABD & PELVIS W/CONTRAST: CPT

## 2025-02-09 PROCEDURE — 6360000004 HC RX CONTRAST MEDICATION: Performed by: SURGERY

## 2025-02-09 PROCEDURE — 6370000000 HC RX 637 (ALT 250 FOR IP): Performed by: INTERNAL MEDICINE

## 2025-02-09 PROCEDURE — 6360000002 HC RX W HCPCS: Performed by: INTERNAL MEDICINE

## 2025-02-09 PROCEDURE — 2500000003 HC RX 250 WO HCPCS: Performed by: INTERNAL MEDICINE

## 2025-02-09 PROCEDURE — 2580000003 HC RX 258: Performed by: INTERNAL MEDICINE

## 2025-02-09 PROCEDURE — 80048 BASIC METABOLIC PNL TOTAL CA: CPT

## 2025-02-09 PROCEDURE — 36415 COLL VENOUS BLD VENIPUNCTURE: CPT

## 2025-02-09 RX ORDER — IOPAMIDOL 755 MG/ML
75 INJECTION, SOLUTION INTRAVASCULAR
Status: COMPLETED | OUTPATIENT
Start: 2025-02-09 | End: 2025-02-09

## 2025-02-09 RX ORDER — CEFDINIR 300 MG/1
300 CAPSULE ORAL 2 TIMES DAILY
Qty: 14 CAPSULE | Refills: 0 | Status: SHIPPED | OUTPATIENT
Start: 2025-02-09 | End: 2025-02-16

## 2025-02-09 RX ORDER — L. ACIDOPHILUS/L.BULGARICUS 100MM CELL
1 GRANULES IN PACKET (EA) ORAL 2 TIMES DAILY
Qty: 10 PACKET | Refills: 0 | Status: SHIPPED | OUTPATIENT
Start: 2025-02-09

## 2025-02-09 RX ADMIN — SODIUM CHLORIDE: 9 INJECTION, SOLUTION INTRAVENOUS at 12:58

## 2025-02-09 RX ADMIN — CETIRIZINE HYDROCHLORIDE 10 MG: 10 TABLET, FILM COATED ORAL at 07:44

## 2025-02-09 RX ADMIN — SODIUM CHLORIDE, PRESERVATIVE FREE 10 ML: 5 INJECTION INTRAVENOUS at 07:45

## 2025-02-09 RX ADMIN — PHENAZOPYRIDINE 100 MG: 100 TABLET ORAL at 00:14

## 2025-02-09 RX ADMIN — IOPAMIDOL 75 ML: 755 INJECTION, SOLUTION INTRAVENOUS at 13:46

## 2025-02-09 RX ADMIN — WATER 1000 MG: 1 INJECTION INTRAMUSCULAR; INTRAVENOUS; SUBCUTANEOUS at 07:45

## 2025-02-09 RX ADMIN — Medication 1 TABLET: at 07:44

## 2025-02-09 ASSESSMENT — PAIN SCALES - GENERAL: PAINLEVEL_OUTOF10: 0

## 2025-02-09 NOTE — PROGRESS NOTES
Patient admitted to 3310.  Alert and oriented x 4.  Vitals stable, afebrile.  RA.  C/o abdominal cramping/pain, denies need for medication at this time.  Lungs clear.  Abdomen soft.  No edema.  IVF infusing.  Patient with steady gait.  Independent with adl's.  Skin intact.  Call light and plan of care reviewed.  Patient encouraged to call with needs and concerns.

## 2025-02-09 NOTE — ED PROVIDER NOTES
THE [facility]  EMERGENCY DEPARTMENT ENCOUNTER          EM RESIDENT NOTE     Date of evaluation: 2/8/2025    ADDENDUM:      Care of this patient was assumed from Dr. Ibanez.  The patient was seen for Abdominal Pain (C/o ongoing lower pubic area abdominal pain, Jan 26 tele health visit and given Bactrium x 10 days, then not all the way better and went to PCP on Wednesday and gave patient Cipro. Went to Urgent care today and sent here to r/o kidney stone, denies n/v/d or fevers ) and Back Pain  .  The patient's initial evaluation and plan have been discussed with the prior provider who initially evaluated the patient.  Nursing Notes, Past Medical Hx, Past Surgical Hx, Social Hx, Allergies, and Family Hx were all reviewed.    MEDICAL DECISION MAKING / ASSESSMENT / PLAN     Shraddha Hanosn is a 49 y.o. female with a history of recurrent UTI who presents with UTI refractory to 2 courses of outpatient p.o. antibiotics.  On reevaluation, she reports ongoing pain.  At this point she warrants admission for IV antibiotics.    IV antibiotics started, at this time the patient has been admitted to medicine for further evaluation and management of UTI. The patient will continue to be monitored here in the emergency department until which time they are moved to their new treatment location.    Is this patient to be included in the SEP-1 core measure? No Exclusion criteria - the patient is NOT to be included for SEP-1 Core Measure due to: 2+ SIRS criteria are not met    Medical Decision Making  Amount and/or Complexity of Data Reviewed  Labs: ordered.    Risk  Prescription drug management.  Decision regarding hospitalization.        This patient was also evaluated by the attending physician. All care plans were discussed and agreed upon.    Clinical Impression     No diagnosis found.    Disposition     PATIENT REFERRED TO:  No follow-up provider specified.    DISCHARGE MEDICATIONS:  New Prescriptions    No medications on

## 2025-02-09 NOTE — ED PROVIDER NOTES
ED Attending Attestation Note     Date of evaluation: 2/8/2025    This patient was seen by the resident.  I have seen and examined the patient, agree with the workup, evaluation, management and diagnosis. The care plan has been discussed.  My assessment reveals well-appearing female lying on stretcher reporting pain in her suprapubic region.  Patient has failed a course of outpatient Cipro and Bactrim for urinary tract infection.  Her urine today is still positive for nitrates and she is incredibly tender in the suprapubic region.  Denies hematuria fevers or other systemic symptoms.  No history of renal or bladder stones.  Will plan for admission for IV antibiotics and culture monitoring.        Janina Peña MD  02/08/25 [id number]

## 2025-02-09 NOTE — PLAN OF CARE
Problem: Discharge Planning  Goal: Discharge to home or other facility with appropriate resources  Outcome: Progressing   Plans to return home when medically stable.    Problem: Pain  Goal: Verbalizes/displays adequate comfort level or baseline comfort level  Outcome: Progressing   C/o abdominal cramping, denies need for pain medication.  Will monitor pain and medicate as needed.

## 2025-02-09 NOTE — ED NOTES
Patient Name: Shraddha Hanson  : 1975 49 y.o.  MRN: [id number]  ED Room #: B24/B24-24     Chief complaint:   Chief Complaint   Patient presents with    Abdominal Pain     C/o ongoing lower pubic area abdominal pain,  tele health visit and given Bactrium x 10 days, then not all the way better and went to PCP on Wednesday and gave patient Cipro. Went to Urgent care today and sent here to r/o kidney stone, denies n/v/d or fevers     Back Pain     Hospital Problem/Diagnosis:       O2 Flow Rate:O2 Device: None (Room air)   (if applicable)  Cardiac Rhythm:   (if applicable)  Active LDA's:   Peripheral IV 25 Right Antecubital (Active)            How does patient ambulate? Low Fall Risk (Ambulates by themselves without support    2. How does patient take pills? Whole with Water    3. Is patient alert? Alert    4. Is patient oriented? To Person, To Place, To Time, To Situation, and Follows Commands    5.   Patient arrived from:  home  Facility Name: ___________________________________________    6. If patient is disoriented or from a Skill Nursing Facility has family been notified of admission? No    7. Patient belongings? Belongings: Cell Phone, Wallet, and Clothing    Disposition of belongings? Kept with Patient     8. Any specific patient or family belongings/needs/dynamics?   a. na    9. Miscellaneous comments/pending orders?  a. Patient is pleasant and cooperative.  Independent at home.  Up as tolerated.  Hx of UTI      If there are any additional questions please reach out to the Emergency Department.  Mey [phone]     Mey Gomez, RN  [phone]

## 2025-02-09 NOTE — PROGRESS NOTES
Patient is alert and oriented x4, VSS. Denies pain at this time but does complain of continued dysuria and tenderness. Voiding adequately. Tolerating diet. Tolerating ambulation well. Lungs clear and bowel sounds active. No needs at this time.

## 2025-02-09 NOTE — H&P
Hospital Medicine History & Physical      Date of Admission: 2/8/2025    Date of Service:  Pt seen/examined on 02/08/25     [x]Admitted to Inpatient with expected LOS greater than two midnights due to medical therapy.  []Placed in Observation status.    Chief Admission Complaint: Persistent UTI    Presenting Admission History:      49 y.o. female without significant past medical history who presented to ED with a complaint of intractable dysuria and urinary frequency.  Patient started having her urinary symptoms a little over a week ago.  Patient was seen in urgent care clinic remotely and was diagnosed with UTI.  Patient was prescribed Bactrim.  Patient finished a course of Bactrim however his urinary symptoms did not improve.  Patient was seen by her primary care provider who prescribed her a course of Cipro.  During these 2 medical encounter urine culture was not sent and the patient was treated empirically with oral antibiotics for uncomplicated cystitis.  Patient continues to have dysuria pelvic pain and urinary frequency.  In ED patient received IV Rocephin.  Patient is currently admitted for the management of urinary tract infection which failed outpatient therapy..      ROS:     Review of 10 systems is negative except what is outlined in HPI.     Assessment:  Acute cystitis without hematuria, failed outpatient treatment.  Generalized anxiety disorder    Plan:    Patient was started on IV Rocephin.  She already completed 2 courses of Bactrim and ciprofloxacin as outpatient recently.  Follow-up urine culture however the yield of this culture might be compromised by recent use of antibiotics.  Consider CT abdomen pelvis or infectious disease consultation if the patient symptoms do not improve.  SQ Lovenox for DVT prophylaxis    Physical Exam Performed:      /79   Pulse 84   Temp 97.9 °F (36.6 °C) (Oral)   Resp 17   Ht 1.676 m (5' 6\")   Wt 59.7 kg (131 lb 9.6 oz)   LMP 10/01/2024   SpO2 100%    183

## 2025-02-09 NOTE — PROGRESS NOTES
Patient discharged with all belongings via wheelchair to family members car. IV removed. Discharge instructions throughly explained.

## 2025-02-09 NOTE — PROGRESS NOTES
4 Eyes Skin Assessment     NAME:  Shraddha Hanson  YOB: 1975  MEDICAL RECORD NUMBER:  [id number]    The patient is being assessed for  Admission    I agree that at least one RN has performed a thorough Head to Toe Skin Assessment on the patient. ALL assessment sites listed below have been assessed.      Areas assessed by both nurses:    Head, Face, Ears, Shoulders, Back, Chest, Arms, Elbows, Hands, Sacrum. Buttock, Coccyx, Ischium, Legs. Feet and Heels, and Under Medical Devices         Does the Patient have a Wound? No noted wound(s)       Jamal Prevention initiated by RN: No  Wound Care Orders initiated by RN: No    Pressure Injury (Stage 3,4, Unstageable, DTI, NWPT, and Complex wounds) if present, place Wound referral order by RN under : No    New Ostomies, if present place, Ostomy referral order under : No     Nurse 1 eSignature: Electronically signed by Sydnee Calhoun RN on 2/8/25 at 11:40 PM EST    **SHARE this note so that the co-signing nurse can place an eSignature**    Nurse 2 eSignature: {Esignature:[id number]}

## 2025-02-09 NOTE — ED NOTES
Patient complains of abdominal and back pain.  Patient reports that she has completed 2 rounds of abx without feeling any better.  PIV placed using aseptic technique per hospital policy.  Blood collected and sent to lab.       Mey Gomez RN  02/08/25 [id number]

## 2025-02-09 NOTE — PLAN OF CARE
Problem: Pain  Goal: Verbalizes/displays adequate comfort level or baseline comfort level  2/9/2025 0911 by Simi Hawthorne RN  Outcome: Progressing   Numeric pain rating scale being used. Patient repositioned for comfort.       Problem: Safety - Adult  Goal: Free from fall injury  Outcome: Progressing   Standard safety measures in place.

## 2025-02-10 ENCOUNTER — TELEPHONE (OUTPATIENT)
Dept: FAMILY MEDICINE CLINIC | Age: 50
End: 2025-02-10

## 2025-02-10 LAB — BACTERIA UR CULT: NORMAL

## 2025-02-10 NOTE — DISCHARGE SUMMARY
V2.0  Discharge Summary    Name:  Shraddha Hanson /Age/Sex: 1975 (49 y.o. female)   Admit Date: 2025  Discharge Date: 2/10/25    MRN & CSN:  [id number] & [id number] Encounter Date and Time 2/10/25 3:16 PM EST    Attending:  No att. providers found Discharging Provider: Cristóbal Moreno MD       Discharge Diagnosess:     Suspected UTI, failed outpatient antibiotics  Dysuria  Pubic symphysis tenderness  Perimenopause      Admission HPI, hospital course, and consultants:     Admission HPI:  \"49 y.o. female without significant past medical history who presented to ED with a complaint of intractable dysuria and urinary frequency.  Patient started having her urinary symptoms a little over a week ago.  Patient was seen in urgent care clinic remotely and was diagnosed with UTI.  Patient was prescribed Bactrim.  Patient finished a course of Bactrim however his urinary symptoms did not improve.  Patient was seen by her primary care provider who prescribed her a course of Cipro.  During these 2 medical encounter urine culture was not sent and the patient was treated empirically with oral antibiotics for uncomplicated cystitis.  Patient continues to have dysuria pelvic pain and urinary frequency.  In ED patient received IV Rocephin.      Patient describes pain at pubic symphysis and upon cessation of urination. She has never major issues with frequent  UTIs in the past (only 1-2 times a year) and she doesn't have risk factors for resistance organisms. She is perimenopausal, but still menstruates occasionally. She has two adult children.     Hospital course:  Patient discharged on cefdinir and advised to keep her gynecology appointment this week to be evaluated for atrophic vaginitis.      The patient expressed appropriate understanding of, and agreement with the discharge recommendations, medications, and plan.     Consults this admission:  None    Discharge Instructions:   Follow up appointments: PCP,

## 2025-02-10 NOTE — TELEPHONE ENCOUNTER
Care Transitions Initial Follow Up Call    Outreach made within 2 business days of discharge: Yes    Patient: Shraddha Hanson Patient : 1975   MRN: [id number]  Reason for Admission: There are no discharge diagnoses documented for the most recent discharge.  Discharge Date: 25       Spoke with: PT    Discharge department/facility: [facility] Interactive Patient Contact:  Was patient able to fill all prescriptions: Yes  Was patient instructed to bring all medications to the follow-up visit: Yes  Is patient taking all medications as directed in the discharge summary? Yes  Does patient understand their discharge instructions: Yes  Does patient have questions or concerns that need addressed prior to 7-14 day follow up office visit: No    Scheduled appointment with PCP within 7-14 days    Follow Up  Future Appointments   Date Time Provider Department [place]   2025  8:30 AM Arabella Dodd DO [facility]   2025  2:00 PM Beba Juarez PA [facility]       Meseret Olvera MA

## 2025-02-11 ENCOUNTER — OFFICE VISIT (OUTPATIENT)
Dept: OBGYN CLINIC | Age: 50
End: 2025-02-11
Payer: COMMERCIAL

## 2025-02-11 ENCOUNTER — TELEPHONE (OUTPATIENT)
Dept: OBGYN CLINIC | Age: 50
End: 2025-02-11

## 2025-02-11 VITALS
HEART RATE: 85 BPM | HEIGHT: 66 IN | SYSTOLIC BLOOD PRESSURE: 115 MMHG | BODY MASS INDEX: 21.63 KG/M2 | WEIGHT: 134.6 LBS | OXYGEN SATURATION: 99 % | DIASTOLIC BLOOD PRESSURE: 65 MMHG

## 2025-02-11 DIAGNOSIS — N95.2 VAGINAL ATROPHY: ICD-10-CM

## 2025-02-11 DIAGNOSIS — Z91.89 AT RISK OF URINARY TRACT INFECTION: Primary | ICD-10-CM

## 2025-02-11 DIAGNOSIS — Z12.31 ENCOUNTER FOR SCREENING MAMMOGRAM FOR MALIGNANT NEOPLASM OF BREAST: ICD-10-CM

## 2025-02-11 PROCEDURE — 99213 OFFICE O/P EST LOW 20 MIN: CPT | Performed by: OBSTETRICS & GYNECOLOGY

## 2025-02-11 RX ORDER — CONJUGATED ESTROGENS 0.62 MG/G
0.5 CREAM VAGINAL DAILY
Qty: 30 G | Refills: 2 | Status: SHIPPED | OUTPATIENT
Start: 2025-02-11

## 2025-02-11 NOTE — PROGRESS NOTES
Left eye: No discharge.      Conjunctiva/sclera: Conjunctivae normal.   Cardiovascular:      Rate and Rhythm: Normal rate.   Pulmonary:      Effort: Pulmonary effort is normal.   Musculoskeletal:         General: Normal range of motion.      Cervical back: Normal range of motion and neck supple.   Skin:     General: Skin is warm and dry.   Neurological:      Mental Status: She is alert.   Psychiatric:         Mood and Affect: Mood normal.         Behavior: Behavior normal.        Assessment/Plan:  49 y.o.  presenting for   Chief Complaint   Patient presents with    Follow-up     Ed Follow up : UTI       Diagnosis Orders   1. At risk of urinary tract infection        2. Vaginal atrophy        3. Encounter for screening mammogram for malignant neoplasm of breast  LIZETT DIGITAL SCREEN W OR WO CAD BILATERAL        Discussed placing patient on premarin at this time. Discussed risk, benefits   Order for mammogram placed   Will have patient return to clinic for annual examination or sooner if symptoms worsen   All questions answered and patient verbalized understanding of plan.     Follow Up  - Will call patient with results   -No follow-ups on file.    Arabella Dodd, DO

## 2025-02-11 NOTE — TELEPHONE ENCOUNTER
Pt requesting premarin be sent to [facility] in [place] due to cost. Phoned in prescription per order. Routing as I

## 2025-02-14 ENCOUNTER — TELEPHONE (OUTPATIENT)
Dept: OBGYN CLINIC | Age: 50
End: 2025-02-14

## 2025-02-14 ENCOUNTER — OFFICE VISIT (OUTPATIENT)
Dept: FAMILY MEDICINE CLINIC | Age: 50
End: 2025-02-14

## 2025-02-14 VITALS
WEIGHT: 134 LBS | SYSTOLIC BLOOD PRESSURE: 122 MMHG | HEART RATE: 71 BPM | HEIGHT: 66 IN | DIASTOLIC BLOOD PRESSURE: 76 MMHG | OXYGEN SATURATION: 98 % | RESPIRATION RATE: 18 BRPM | BODY MASS INDEX: 21.53 KG/M2

## 2025-02-14 DIAGNOSIS — N39.0 RECURRENT UTI: Primary | ICD-10-CM

## 2025-02-14 DIAGNOSIS — N95.2 VAGINAL ATROPHY: ICD-10-CM

## 2025-02-14 LAB
BILIRUBIN, POC: NEGATIVE
BLOOD URINE, POC: NEGATIVE
CLARITY, POC: NORMAL
COLOR, POC: NORMAL
GLUCOSE URINE, POC: NEGATIVE MG/DL
KETONES, POC: NEGATIVE MG/DL
LEUKOCYTE EST, POC: NEGATIVE
NITRITE, POC: NEGATIVE
PH, POC: 7
PROTEIN, POC: NEGATIVE MG/DL
SPECIFIC GRAVITY, POC: >=1.03
UROBILINOGEN, POC: NORMAL MG/DL

## 2025-02-14 ASSESSMENT — PATIENT HEALTH QUESTIONNAIRE - PHQ9
SUM OF ALL RESPONSES TO PHQ QUESTIONS 1-9: 0
SUM OF ALL RESPONSES TO PHQ QUESTIONS 1-9: 0
1. LITTLE INTEREST OR PLEASURE IN DOING THINGS: NOT AT ALL
2. FEELING DOWN, DEPRESSED OR HOPELESS: NOT AT ALL
SUM OF ALL RESPONSES TO PHQ9 QUESTIONS 1 & 2: 0
SUM OF ALL RESPONSES TO PHQ QUESTIONS 1-9: 0
SUM OF ALL RESPONSES TO PHQ QUESTIONS 1-9: 0

## 2025-02-14 ASSESSMENT — ENCOUNTER SYMPTOMS: RESPIRATORY NEGATIVE: 1

## 2025-02-14 NOTE — PROGRESS NOTES
Subjective   Patient ID: Shraddha Hanson is a 49 y.o. female.    HPI  Patient here today for hospital follow up. Was treated in office and urgent care for UTI took bactrim and cipro. Was still having pain so sent to Er on 2/8, given rocephin but urine culture came back negative. She is feeling fine now. Urine clear in office. Has seen GYN who dx her with vaginal atrophy and she is supposed to start estrogen cream but it is over $400, so will follow up with them for alternatives.     Review of Systems   Constitutional: Negative.    Respiratory: Negative.     Cardiovascular: Negative.    Genitourinary: Negative.           Objective   Physical Exam  Constitutional:       Appearance: Normal appearance.   Cardiovascular:      Rate and Rhythm: Normal rate and regular rhythm.      Heart sounds: Normal heart sounds.   Neurological:      General: No focal deficit present.      Mental Status: She is alert and oriented to person, place, and time.            Assessment /Plan     Diagnosis Orders   1. Recurrent UTI  resolved      2. Vaginal atrophy  To follow with GYN for estrogen alternatives              KT Guerrero

## 2025-02-14 NOTE — TELEPHONE ENCOUNTER
Patient stopped in office today to inform MD that they premarin cream ordered was too expensive.     She states that MD had discussed cheaper options at time of visit.     Routing to MD to review and advice of other medication.

## 2025-02-18 RX ORDER — ESTRADIOL 0.1 MG/G
CREAM VAGINAL
Qty: 42.5 G | Refills: 1 | OUTPATIENT
Start: 2025-02-18

## 2025-02-18 NOTE — TELEPHONE ENCOUNTER
Per MD:   Sorry for the delay it is okay to send the patient Estrace 0.01% directions 2 to 4 g per vagina daily for 2 weeks then taper to 1 g 1-3 times per week.

## 2025-02-19 ENCOUNTER — PATIENT MESSAGE (OUTPATIENT)
Dept: OBGYN CLINIC | Age: 50
End: 2025-02-19

## 2025-02-19 NOTE — TELEPHONE ENCOUNTER
Pt called in and she picked up the premarin and paid for it so she's going to finish it out and then switch back to the estrogen d/t the cost.      Gave her instructions to use twice weekly per Dr Dodd. Done.

## 2025-02-19 NOTE — TELEPHONE ENCOUNTER
Previous encounter stated Premarin was too expensive and estrogen ordered. Is dosage same as pt is inquiring about Premarin dose.   Estrace 0.01% directions 2 to 4 g per vagina daily for 2 weeks then taper to 1 g 1-3 times per week.   Please advise

## 2025-02-19 NOTE — TELEPHONE ENCOUNTER
LM for pt to call the office to see which medication she is referring to. Previously she had stated the Premarin was too expensive and a prescription was sent for estrogen. We need to verify which she picked up from the Pharmacy and confirm dose of that medication.

## 2025-02-24 ENCOUNTER — HOSPITAL ENCOUNTER (OUTPATIENT)
Age: 50
Setting detail: SPECIMEN
Discharge: HOME | End: 2025-02-24

## 2025-02-24 ENCOUNTER — HOSPITAL ENCOUNTER (OUTPATIENT)
Age: 50
Discharge: HOME | End: 2025-02-24

## 2025-02-24 ENCOUNTER — PATIENT MESSAGE (OUTPATIENT)
Dept: FAMILY MEDICINE CLINIC | Age: 50
End: 2025-02-24

## 2025-02-24 DIAGNOSIS — R19.7 DIARRHEA, UNSPECIFIED TYPE: Primary | ICD-10-CM

## 2025-02-24 DIAGNOSIS — R19.7 DIARRHEA, UNSPECIFIED TYPE: ICD-10-CM

## 2025-02-27 NOTE — TELEPHONE ENCOUNTER
Called pt and informed pt to go [facility] lab to get container to get checked for C-diff. Told pt to check in with  who will give pt directions for lab.   
GI referral?  
Pt was called and she states that she started a probiotic and that this seemed to help the issue. She states that she does not want to run the test at this time but will call back if symptoms return.  
Test was discontinued by lab due to formed stool. Office was not informed. Would you like pt to redo the test?  
Altered Nutrition Related Lab Values

## 2025-03-18 ENCOUNTER — HOSPITAL ENCOUNTER (OUTPATIENT)
Dept: WOMENS IMAGING | Age: 50
Discharge: HOME | End: 2025-03-18
Attending: OBSTETRICS & GYNECOLOGY
Payer: COMMERCIAL

## 2025-03-18 VITALS — WEIGHT: 128 LBS | HEIGHT: 66 IN | BODY MASS INDEX: 20.57 KG/M2

## 2025-03-18 DIAGNOSIS — Z12.31 ENCOUNTER FOR SCREENING MAMMOGRAM FOR MALIGNANT NEOPLASM OF BREAST: ICD-10-CM

## 2025-03-18 PROCEDURE — 77063 BREAST TOMOSYNTHESIS BI: CPT

## 2025-03-20 ENCOUNTER — TELEPHONE (OUTPATIENT)
Dept: WOMENS IMAGING | Age: 50
End: 2025-03-20

## 2025-03-20 ENCOUNTER — RESULTS FOLLOW-UP (OUTPATIENT)
Dept: WOMENS IMAGING | Age: 50
End: 2025-03-20

## 2025-03-20 DIAGNOSIS — R92.8 ABNORMAL FINDING ON BREAST IMAGING: Primary | ICD-10-CM

## 2025-03-20 NOTE — TELEPHONE ENCOUNTER
Left message for patient, requesting return call.  Calling regarding screening mammogram follow-up recommendations.    PORTILLO Noland, CN-BM  Patient Navigator  [facility]  [phone]

## 2025-03-27 ENCOUNTER — HOSPITAL ENCOUNTER (OUTPATIENT)
Dept: WOMENS IMAGING | Age: 50
Discharge: HOME | End: 2025-03-27
Payer: COMMERCIAL

## 2025-03-27 DIAGNOSIS — R92.8 ABNORMAL FINDING ON BREAST IMAGING: ICD-10-CM

## 2025-03-27 PROCEDURE — 76642 ULTRASOUND BREAST LIMITED: CPT

## 2025-03-27 PROCEDURE — G0279 TOMOSYNTHESIS, MAMMO: HCPCS

## 2025-03-29 ENCOUNTER — RESULTS FOLLOW-UP (OUTPATIENT)
Dept: OBGYN | Age: 50
End: 2025-03-29

## 2025-04-01 ENCOUNTER — OFFICE VISIT (OUTPATIENT)
Dept: OBGYN CLINIC | Age: 50
End: 2025-04-01
Payer: COMMERCIAL

## 2025-04-01 VITALS — WEIGHT: 127.4 LBS | BODY MASS INDEX: 20.48 KG/M2 | HEIGHT: 66 IN

## 2025-04-01 DIAGNOSIS — R10.2 PELVIC PAIN: Primary | ICD-10-CM

## 2025-04-01 DIAGNOSIS — F41.1 GENERALIZED ANXIETY DISORDER: ICD-10-CM

## 2025-04-01 PROCEDURE — 99213 OFFICE O/P EST LOW 20 MIN: CPT | Performed by: OBSTETRICS & GYNECOLOGY

## 2025-04-01 RX ORDER — FLUOXETINE 10 MG/1
10 CAPSULE ORAL DAILY
Qty: 30 CAPSULE | Refills: 3 | Status: SHIPPED | OUTPATIENT
Start: 2025-04-01

## 2025-04-01 NOTE — PROGRESS NOTES
[facility]     CC:   Chief Complaint   Patient presents with    Follow-up     Follow up with UTI symptoms         HPI:  49 y.o.  presents with:   49 year old female  presents for follow up. She reports that she is having lower pelvic pain for the past week. She reports that she had a urine culture that was positive for E.coli. She was placed on Augmentin for five days. She reports that she was still having symptoms and had this extended another five days. She was then placed on Fosfomycin. She had a urine culture yesterday that was negative. She reports that she is having a tingling pain and burning in her lower abdomen. She denies vaginal discharge. She has not been sexually active in the past year. She is still taking estrace at this time. She recently had a vaginitis panel and gonorrhea/chlamydia that was negative. She denies any changes to her diet.          Medications:  Current Outpatient Medications   Medication Sig Dispense Refill    FLUoxetine (PROZAC) 10 MG capsule Take 1 capsule by mouth daily 30 capsule 3    estradiol (ESTRACE VAGINAL) 0.1 MG/GM vaginal cream Patient to use 2 grams daily for 2 weeks.    Then patient to taper to 1 gram 3 days per week. 42.5 g 1    estrogens conjugated (PREMARIN) 0.625 MG/GM CREA vaginal cream Place 0.5 g vaginally daily 30 g 2    levocetirizine (XYZAL ALLERGY 24HR) 5 MG tablet Take 0.5 tablets by mouth nightly      Multiple Vitamins-Minerals (THERAPEUTIC MULTIVITAMIN-MINERALS) tablet Take 1 tablet by mouth daily       No current facility-administered medications for this visit.       Allergies:  Allergies   Allergen Reactions    Codeine Other (See Comments)    Tetracycline Diarrhea, Hives and Nausea And Vomiting       ROS:  Review of Systems     Physical Exam:  Physical Exam  Vitals and nursing note reviewed. Exam conducted with a chaperone present.   Constitutional:       Appearance: Normal appearance.   HENT:      Head: Normocephalic.   Eyes:

## 2025-04-02 ENCOUNTER — OFFICE VISIT (OUTPATIENT)
Dept: OBGYN CLINIC | Age: 50
End: 2025-04-02
Payer: COMMERCIAL

## 2025-04-02 VITALS
WEIGHT: 127 LBS | DIASTOLIC BLOOD PRESSURE: 76 MMHG | SYSTOLIC BLOOD PRESSURE: 121 MMHG | HEART RATE: 85 BPM | HEIGHT: 66 IN | OXYGEN SATURATION: 99 % | BODY MASS INDEX: 20.41 KG/M2

## 2025-04-02 DIAGNOSIS — N84.0 ENDOMETRIAL POLYP: Primary | ICD-10-CM

## 2025-04-02 PROCEDURE — 99213 OFFICE O/P EST LOW 20 MIN: CPT | Performed by: OBSTETRICS & GYNECOLOGY

## 2025-04-02 NOTE — PROGRESS NOTES
[facility]     CC:   Chief Complaint   Patient presents with    Follow-up     Follow up with US         HPI:  49 y.o.  presents with:   49 year old female  presents for follow up. Patient reports her pain has improved since yesterday. She has an appointment with urology tomorrow.        Medications:  Current Outpatient Medications   Medication Sig Dispense Refill    FLUoxetine (PROZAC) 10 MG capsule Take 1 capsule by mouth daily 30 capsule 3    estradiol (ESTRACE VAGINAL) 0.1 MG/GM vaginal cream Patient to use 2 grams daily for 2 weeks.    Then patient to taper to 1 gram 3 days per week. 42.5 g 1    estrogens conjugated (PREMARIN) 0.625 MG/GM CREA vaginal cream Place 0.5 g vaginally daily 30 g 2    levocetirizine (XYZAL ALLERGY 24HR) 5 MG tablet Take 0.5 tablets by mouth nightly      Multiple Vitamins-Minerals (THERAPEUTIC MULTIVITAMIN-MINERALS) tablet Take 1 tablet by mouth daily       No current facility-administered medications for this visit.       Allergies:  Allergies   Allergen Reactions    Codeine Other (See Comments)    Tetracycline Diarrhea, Hives and Nausea And Vomiting       ROS:  Review of Systems   Genitourinary:  Positive for pelvic pain.        Physical Exam:  Physical Exam  Vitals and nursing note reviewed.   Constitutional:       Appearance: Normal appearance.   HENT:      Head: Normocephalic and atraumatic.   Eyes:      General:         Right eye: No discharge.         Left eye: No discharge.      Conjunctiva/sclera: Conjunctivae normal.   Cardiovascular:      Rate and Rhythm: Normal rate.   Pulmonary:      Effort: Pulmonary effort is normal.   Musculoskeletal:         General: Normal range of motion.      Cervical back: Normal range of motion and neck supple.   Skin:     General: Skin is warm and dry.   Neurological:      Mental Status: She is alert.   Psychiatric:         Mood and Affect: Mood normal.         Behavior: Behavior normal.          PELVIC ULTRASOUND without DOPPLER

## 2025-04-03 ENCOUNTER — RESULTS FOLLOW-UP (OUTPATIENT)
Dept: OBGYN CLINIC | Age: 50
End: 2025-04-03

## 2025-04-03 ENCOUNTER — HOSPITAL ENCOUNTER (EMERGENCY)
Age: 50
Discharge: HOME | End: 2025-04-03
Attending: STUDENT IN AN ORGANIZED HEALTH CARE EDUCATION/TRAINING PROGRAM
Payer: COMMERCIAL

## 2025-04-03 VITALS
SYSTOLIC BLOOD PRESSURE: 118 MMHG | HEART RATE: 79 BPM | BODY MASS INDEX: 24.28 KG/M2 | RESPIRATION RATE: 16 BRPM | TEMPERATURE: 98.2 F | DIASTOLIC BLOOD PRESSURE: 75 MMHG | HEIGHT: 61 IN | OXYGEN SATURATION: 97 % | WEIGHT: 128.6 LBS

## 2025-04-03 DIAGNOSIS — N10 ACUTE PYELONEPHRITIS: Primary | ICD-10-CM

## 2025-04-03 LAB
BACTERIA UR CULT: ABNORMAL
BACTERIA URNS QL MICRO: ABNORMAL /HPF
BILIRUB UR QL STRIP.AUTO: NEGATIVE
CLARITY UR: CLEAR
COLOR UR: YELLOW
EPI CELLS #/AREA URNS HPF: ABNORMAL /HPF (ref 0–5)
GLUCOSE UR STRIP.AUTO-MCNC: 100 MG/DL
HGB UR QL STRIP.AUTO: NEGATIVE
KETONES UR STRIP.AUTO-MCNC: NEGATIVE MG/DL
LEUKOCYTE ESTERASE UR QL STRIP.AUTO: NEGATIVE
NITRITE UR QL STRIP.AUTO: POSITIVE
ORGANISM: ABNORMAL
PH UR STRIP.AUTO: 6.5 [PH] (ref 5–8)
PROT UR STRIP.AUTO-MCNC: 30 MG/DL
RBC #/AREA URNS HPF: ABNORMAL /HPF (ref 0–4)
SP GR UR STRIP.AUTO: 1.01 (ref 1–1.03)
UA COMPLETE W REFLEX CULTURE PNL UR: ABNORMAL
UA DIPSTICK W REFLEX MICRO PNL UR: YES
URN SPEC COLLECT METH UR: ABNORMAL
UROBILINOGEN UR STRIP-ACNC: 4 E.U./DL
WBC #/AREA URNS HPF: ABNORMAL /HPF (ref 0–5)

## 2025-04-03 PROCEDURE — 99283 EMERGENCY DEPT VISIT LOW MDM: CPT

## 2025-04-03 PROCEDURE — 6370000000 HC RX 637 (ALT 250 FOR IP): Performed by: STUDENT IN AN ORGANIZED HEALTH CARE EDUCATION/TRAINING PROGRAM

## 2025-04-03 PROCEDURE — 81001 URINALYSIS AUTO W/SCOPE: CPT

## 2025-04-03 RX ORDER — SULFAMETHOXAZOLE AND TRIMETHOPRIM 800; 160 MG/1; MG/1
1 TABLET ORAL 2 TIMES DAILY
Qty: 14 TABLET | Refills: 0 | Status: SHIPPED | OUTPATIENT
Start: 2025-04-03 | End: 2025-04-10

## 2025-04-03 RX ORDER — PHENAZOPYRIDINE HYDROCHLORIDE 100 MG/1
100 TABLET, FILM COATED ORAL 3 TIMES DAILY PRN
Qty: 21 TABLET | Refills: 0 | Status: SHIPPED | OUTPATIENT
Start: 2025-04-03 | End: 2026-04-03

## 2025-04-03 RX ORDER — SULFAMETHOXAZOLE AND TRIMETHOPRIM 800; 160 MG/1; MG/1
1 TABLET ORAL ONCE
Status: COMPLETED | OUTPATIENT
Start: 2025-04-03 | End: 2025-04-03

## 2025-04-03 RX ORDER — OXYCODONE HYDROCHLORIDE 5 MG/1
5 TABLET ORAL ONCE
Refills: 0 | Status: COMPLETED | OUTPATIENT
Start: 2025-04-03 | End: 2025-04-03

## 2025-04-03 RX ADMIN — OXYCODONE HYDROCHLORIDE 5 MG: 5 TABLET ORAL at 21:45

## 2025-04-03 RX ADMIN — SULFAMETHOXAZOLE AND TRIMETHOPRIM 1 TABLET: 800; 160 TABLET ORAL at 21:11

## 2025-04-03 ASSESSMENT — PAIN SCALES - GENERAL: PAINLEVEL_OUTOF10: 7

## 2025-04-03 ASSESSMENT — PAIN - FUNCTIONAL ASSESSMENT: PAIN_FUNCTIONAL_ASSESSMENT: 0-10

## 2025-04-03 ASSESSMENT — PAIN DESCRIPTION - LOCATION: LOCATION: PELVIS

## 2025-04-04 NOTE — ED PROVIDER NOTES
THE [facility]  EMERGENCY DEPARTMENT ENCOUNTER          ATTENDING PHYSICIAN NOTE       Date of evaluation: 4/3/2025    Chief Complaint     Urinary Tract Infection (Pt coming from home for UTI. Had culture resulted with urology and found to be positive with E.Coli but pharmacy closed before she could get atb rx and states pelvic pain/burning is worsening. )      History of Present Illness     Shraddha Hanson is a 49 y.o. female who presents with past medical history of recurrent UTIs presenting with abdominal pain, dysuria, and requesting a dose of antibiotics.  She was seen by her urologist as well as gynecologist recently for her recurrent UTIs.  She was recently prescribed an additional course of fosfomycin after she had a positive urine culture.  Unfortunately, she has not been able to get to the pharmacy yet to  her antibiotics.  She recently finished a dose of fosfomycin as well as more distantly took Bactrim and ciprofloxacin, neither of which resolved her UTI.  She also recently had a course of Augmentin which did not solve the problems.  Her biggest complaint is suprapubic tenderness as well as burning with urination.  She is been taking tramadol at home for the pain.    ASSESSMENT / PLAN  (MEDICAL DECISION MAKING)     INITIAL VITALS: BP: 118/75, Temp: 98.2 °F (36.8 °C), Pulse: 79, Respirations: 16, SpO2: 97 %      Shraddha Hanson is a 49 y.o. female who presents with past medical history of recurrent UTIs presenting with abdominal pain, dysuria, and requesting a dose of antibiotics.  She was seen by her urologist as well as gynecologist recently for her recurrent UTIs.    Reviewed patient's most recent urine culture.  She does have sensitivity to Bactrim, Macrobid, and ciprofloxacin.  In comparison with her prior culture, it appears that it is a different strain of E. coli as the sensitivities are quite different.  Will plan to dose with a single dose of Bactrim here and prescribe a 7-day  used smokeless tobacco. She reports current alcohol use of about 1.0 standard drink of alcohol per week. She reports that she does not use drugs.    Medications     Previous Medications    ESTRADIOL (ESTRACE VAGINAL) 0.1 MG/GM VAGINAL CREAM    Patient to use 2 grams daily for 2 weeks.    Then patient to taper to 1 gram 3 days per week.    ESTROGENS CONJUGATED (PREMARIN) 0.625 MG/GM CREA VAGINAL CREAM    Place 0.5 g vaginally daily    FLUOXETINE (PROZAC) 10 MG CAPSULE    Take 1 capsule by mouth daily    LEVOCETIRIZINE (XYZAL ALLERGY 24HR) 5 MG TABLET    Take 0.5 tablets by mouth nightly    MULTIPLE VITAMINS-MINERALS (THERAPEUTIC MULTIVITAMIN-MINERALS) TABLET    Take 1 tablet by mouth daily       Allergies     She is allergic to codeine and tetracycline.    Physical Exam     INITIAL VITALS: BP: 118/75, Temp: 98.2 °F (36.8 °C), Pulse: 79, Respirations: 16, SpO2: 97 %   Physical Exam  Vitals reviewed.   HENT:      Head: Normocephalic and atraumatic.   Eyes:      Pupils: Pupils are equal, round, and reactive to light.   Cardiovascular:      Rate and Rhythm: Normal rate.   Pulmonary:      Effort: Pulmonary effort is normal.   Abdominal:      Palpations: Abdomen is soft.   Musculoskeletal:         General: Normal range of motion.      Cervical back: Normal range of motion.   Skin:     General: Skin is warm and dry.   Neurological:      General: No focal deficit present.      Mental Status: She is alert and oriented to person, place, and time.   Psychiatric:         Mood and Affect: Mood normal.                    Wilfredo Wang MD  04/03/25 1540

## 2025-04-04 NOTE — ED NOTES
Reviewed discharge instructions, medication reconciliation, and patient education information with patient. Patient stated understanding, and answered questions to satisfaction. Patient verbalized satisfaction of care. Patient ambulated safely to exit with a steady gait in no obvious acute distress. Patient verbalized understanding of returning to ER if symptoms worsen, and to follow up with primary health care provider.        Tarsha Coughlin RN  04/03/25 7013

## 2025-04-09 ENCOUNTER — HOSPITAL ENCOUNTER (INPATIENT)
Age: 50
LOS: 2 days | Discharge: HOME | DRG: 690 | End: 2025-04-11
Attending: EMERGENCY MEDICINE | Admitting: INTERNAL MEDICINE
Payer: COMMERCIAL

## 2025-04-09 ENCOUNTER — APPOINTMENT (OUTPATIENT)
Dept: CT IMAGING | Age: 50
DRG: 690 | End: 2025-04-09
Payer: COMMERCIAL

## 2025-04-09 DIAGNOSIS — R10.2 SUPRAPUBIC PAIN: Primary | ICD-10-CM

## 2025-04-09 DIAGNOSIS — R30.0 DYSURIA: ICD-10-CM

## 2025-04-09 PROBLEM — N39.0 UTI (URINARY TRACT INFECTION): Status: ACTIVE | Noted: 2025-04-09

## 2025-04-09 LAB
ALBUMIN SERPL-MCNC: 3.8 G/DL (ref 3.4–5)
ALBUMIN SERPL-MCNC: 4.8 G/DL (ref 3.4–5)
ALP SERPL-CCNC: 73 U/L (ref 40–129)
ALT SERPL-CCNC: 14 U/L (ref 10–40)
ANION GAP SERPL CALCULATED.3IONS-SCNC: 12 MMOL/L (ref 3–16)
ANION GAP SERPL CALCULATED.3IONS-SCNC: 20 MMOL/L (ref 3–16)
AST SERPL-CCNC: 24 U/L (ref 15–37)
BACTERIA URNS QL MICRO: ABNORMAL /HPF
BASOPHILS # BLD: 0.1 K/UL (ref 0–0.2)
BASOPHILS NFR BLD: 0.9 %
BILIRUB DIRECT SERPL-MCNC: 0.2 MG/DL (ref 0–0.3)
BILIRUB INDIRECT SERPL-MCNC: 0.3 MG/DL (ref 0–1)
BILIRUB SERPL-MCNC: 0.5 MG/DL (ref 0–1)
BILIRUB UR QL STRIP.AUTO: NEGATIVE
BUN SERPL-MCNC: 16 MG/DL (ref 7–20)
BUN SERPL-MCNC: 17 MG/DL (ref 7–20)
CALCIUM SERPL-MCNC: 10.2 MG/DL (ref 8.3–10.6)
CALCIUM SERPL-MCNC: 8.3 MG/DL (ref 8.3–10.6)
CHLORIDE SERPL-SCNC: 104 MMOL/L (ref 99–110)
CHLORIDE SERPL-SCNC: 99 MMOL/L (ref 99–110)
CLARITY UR: CLEAR
CO2 SERPL-SCNC: 17 MMOL/L (ref 21–32)
CO2 SERPL-SCNC: 21 MMOL/L (ref 21–32)
COLOR UR: YELLOW
CREAT SERPL-MCNC: 0.7 MG/DL (ref 0.6–1.1)
CREAT SERPL-MCNC: 0.9 MG/DL (ref 0.6–1.1)
DEPRECATED RDW RBC AUTO: 13 % (ref 12.4–15.4)
EOSINOPHIL # BLD: 0 K/UL (ref 0–0.6)
EOSINOPHIL NFR BLD: 0.2 %
EPI CELLS #/AREA URNS HPF: ABNORMAL /HPF (ref 0–5)
GFR SERPLBLD CREATININE-BSD FMLA CKD-EPI: 77 ML/MIN/{1.73_M2}
GFR SERPLBLD CREATININE-BSD FMLA CKD-EPI: >90 ML/MIN/{1.73_M2}
GLUCOSE SERPL-MCNC: 118 MG/DL (ref 70–99)
GLUCOSE SERPL-MCNC: 93 MG/DL (ref 70–99)
GLUCOSE UR STRIP.AUTO-MCNC: 100 MG/DL
HCG UR QL: NEGATIVE
HCT VFR BLD AUTO: 43.1 % (ref 36–48)
HGB BLD-MCNC: 14.9 G/DL (ref 12–16)
HGB UR QL STRIP.AUTO: NEGATIVE
KETONES UR STRIP.AUTO-MCNC: >=80 MG/DL
LACTATE BLDV-SCNC: 1.5 MMOL/L (ref 0.4–1.9)
LACTATE BLDV-SCNC: 3.3 MMOL/L (ref 0.4–1.9)
LEUKOCYTE ESTERASE UR QL STRIP.AUTO: NEGATIVE
LIPASE SERPL-CCNC: 55 U/L (ref 13–60)
LYMPHOCYTES # BLD: 1.3 K/UL (ref 1–5.1)
LYMPHOCYTES NFR BLD: 17.9 %
MCH RBC QN AUTO: 32.2 PG (ref 26–34)
MCHC RBC AUTO-ENTMCNC: 34.5 G/DL (ref 31–36)
MCV RBC AUTO: 93.4 FL (ref 80–100)
MONOCYTES # BLD: 0.5 K/UL (ref 0–1.3)
MONOCYTES NFR BLD: 7.2 %
NEUTROPHILS # BLD: 5.2 K/UL (ref 1.7–7.7)
NEUTROPHILS NFR BLD: 73.8 %
NITRITE UR QL STRIP.AUTO: NEGATIVE
PH UR STRIP.AUTO: 7.5 [PH] (ref 5–8)
PHOSPHATE SERPL-MCNC: 2.9 MG/DL (ref 2.5–4.9)
PLATELET # BLD AUTO: 353 K/UL (ref 135–450)
PMV BLD AUTO: 8.4 FL (ref 5–10.5)
POTASSIUM SERPL-SCNC: 3.9 MMOL/L (ref 3.5–5.1)
POTASSIUM SERPL-SCNC: 4.1 MMOL/L (ref 3.5–5.1)
PROT SERPL-MCNC: 8 G/DL (ref 6.4–8.2)
PROT UR STRIP.AUTO-MCNC: 30 MG/DL
RBC # BLD AUTO: 4.62 M/UL (ref 4–5.2)
RBC #/AREA URNS HPF: ABNORMAL /HPF (ref 0–4)
SODIUM SERPL-SCNC: 136 MMOL/L (ref 136–145)
SODIUM SERPL-SCNC: 137 MMOL/L (ref 136–145)
SP GR UR STRIP.AUTO: 1.02 (ref 1–1.03)
UA COMPLETE W REFLEX CULTURE PNL UR: ABNORMAL
UA DIPSTICK W REFLEX MICRO PNL UR: YES
URN SPEC COLLECT METH UR: ABNORMAL
UROBILINOGEN UR STRIP-ACNC: 1 E.U./DL
WBC # BLD AUTO: 7 K/UL (ref 4–11)
WBC #/AREA URNS HPF: ABNORMAL /HPF (ref 0–5)

## 2025-04-09 PROCEDURE — 83605 ASSAY OF LACTIC ACID: CPT

## 2025-04-09 PROCEDURE — 83690 ASSAY OF LIPASE: CPT

## 2025-04-09 PROCEDURE — 99285 EMERGENCY DEPT VISIT HI MDM: CPT

## 2025-04-09 PROCEDURE — 96361 HYDRATE IV INFUSION ADD-ON: CPT

## 2025-04-09 PROCEDURE — 6360000002 HC RX W HCPCS

## 2025-04-09 PROCEDURE — 2500000003 HC RX 250 WO HCPCS

## 2025-04-09 PROCEDURE — 85025 COMPLETE CBC W/AUTO DIFF WBC: CPT

## 2025-04-09 PROCEDURE — 6370000000 HC RX 637 (ALT 250 FOR IP)

## 2025-04-09 PROCEDURE — 6360000004 HC RX CONTRAST MEDICATION

## 2025-04-09 PROCEDURE — 80076 HEPATIC FUNCTION PANEL: CPT

## 2025-04-09 PROCEDURE — 96374 THER/PROPH/DIAG INJ IV PUSH: CPT

## 2025-04-09 PROCEDURE — 74177 CT ABD & PELVIS W/CONTRAST: CPT

## 2025-04-09 PROCEDURE — 36415 COLL VENOUS BLD VENIPUNCTURE: CPT

## 2025-04-09 PROCEDURE — 80069 RENAL FUNCTION PANEL: CPT

## 2025-04-09 PROCEDURE — G0378 HOSPITAL OBSERVATION PER HR: HCPCS

## 2025-04-09 PROCEDURE — 84703 CHORIONIC GONADOTROPIN ASSAY: CPT

## 2025-04-09 PROCEDURE — 2580000003 HC RX 258

## 2025-04-09 PROCEDURE — 1200000000 HC SEMI PRIVATE

## 2025-04-09 PROCEDURE — 96365 THER/PROPH/DIAG IV INF INIT: CPT

## 2025-04-09 PROCEDURE — 81001 URINALYSIS AUTO W/SCOPE: CPT

## 2025-04-09 RX ORDER — SODIUM CHLORIDE 9 MG/ML
INJECTION, SOLUTION INTRAVENOUS PRN
Status: DISCONTINUED | OUTPATIENT
Start: 2025-04-09 | End: 2025-04-11 | Stop reason: HOSPADM

## 2025-04-09 RX ORDER — SODIUM CHLORIDE 0.9 % (FLUSH) 0.9 %
5-40 SYRINGE (ML) INJECTION PRN
Status: DISCONTINUED | OUTPATIENT
Start: 2025-04-09 | End: 2025-04-11 | Stop reason: HOSPADM

## 2025-04-09 RX ORDER — ACETAMINOPHEN 500 MG
1000 TABLET ORAL 3 TIMES DAILY
Status: DISCONTINUED | OUTPATIENT
Start: 2025-04-10 | End: 2025-04-11 | Stop reason: HOSPADM

## 2025-04-09 RX ORDER — ACETAMINOPHEN 650 MG/1
650 SUPPOSITORY RECTAL EVERY 6 HOURS PRN
Status: DISCONTINUED | OUTPATIENT
Start: 2025-04-09 | End: 2025-04-11 | Stop reason: HOSPADM

## 2025-04-09 RX ORDER — SODIUM CHLORIDE, SODIUM LACTATE, POTASSIUM CHLORIDE, AND CALCIUM CHLORIDE .6; .31; .03; .02 G/100ML; G/100ML; G/100ML; G/100ML
1000 INJECTION, SOLUTION INTRAVENOUS ONCE
Status: COMPLETED | OUTPATIENT
Start: 2025-04-09 | End: 2025-04-09

## 2025-04-09 RX ORDER — CIPROFLOXACIN 2 MG/ML
400 INJECTION, SOLUTION INTRAVENOUS ONCE
Status: COMPLETED | OUTPATIENT
Start: 2025-04-09 | End: 2025-04-09

## 2025-04-09 RX ORDER — IOPAMIDOL 755 MG/ML
75 INJECTION, SOLUTION INTRAVASCULAR
Status: COMPLETED | OUTPATIENT
Start: 2025-04-09 | End: 2025-04-09

## 2025-04-09 RX ORDER — SODIUM CHLORIDE 0.9 % (FLUSH) 0.9 %
5-40 SYRINGE (ML) INJECTION EVERY 12 HOURS SCHEDULED
Status: DISCONTINUED | OUTPATIENT
Start: 2025-04-09 | End: 2025-04-11 | Stop reason: HOSPADM

## 2025-04-09 RX ORDER — POTASSIUM CHLORIDE 1500 MG/1
40 TABLET, EXTENDED RELEASE ORAL PRN
Status: DISCONTINUED | OUTPATIENT
Start: 2025-04-09 | End: 2025-04-10

## 2025-04-09 RX ORDER — POLYETHYLENE GLYCOL 3350 17 G/17G
17 POWDER, FOR SOLUTION ORAL DAILY PRN
Status: DISCONTINUED | OUTPATIENT
Start: 2025-04-09 | End: 2025-04-11 | Stop reason: HOSPADM

## 2025-04-09 RX ORDER — ACETAMINOPHEN 325 MG/1
650 TABLET ORAL EVERY 6 HOURS PRN
Status: DISCONTINUED | OUTPATIENT
Start: 2025-04-09 | End: 2025-04-11 | Stop reason: HOSPADM

## 2025-04-09 RX ORDER — ONDANSETRON 2 MG/ML
4 INJECTION INTRAMUSCULAR; INTRAVENOUS EVERY 6 HOURS PRN
Status: DISCONTINUED | OUTPATIENT
Start: 2025-04-09 | End: 2025-04-11 | Stop reason: HOSPADM

## 2025-04-09 RX ORDER — POTASSIUM CHLORIDE 7.45 MG/ML
10 INJECTION INTRAVENOUS PRN
Status: DISCONTINUED | OUTPATIENT
Start: 2025-04-09 | End: 2025-04-10

## 2025-04-09 RX ORDER — ENOXAPARIN SODIUM 100 MG/ML
40 INJECTION SUBCUTANEOUS DAILY
Status: DISCONTINUED | OUTPATIENT
Start: 2025-04-10 | End: 2025-04-11 | Stop reason: HOSPADM

## 2025-04-09 RX ORDER — 0.9 % SODIUM CHLORIDE 0.9 %
1000 INTRAVENOUS SOLUTION INTRAVENOUS ONCE
Status: COMPLETED | OUTPATIENT
Start: 2025-04-09 | End: 2025-04-09

## 2025-04-09 RX ORDER — MECOBALAMIN 5000 MCG
5 TABLET,DISINTEGRATING ORAL NIGHTLY
Status: DISCONTINUED | OUTPATIENT
Start: 2025-04-09 | End: 2025-04-11 | Stop reason: HOSPADM

## 2025-04-09 RX ORDER — MAGNESIUM SULFATE IN WATER 40 MG/ML
2000 INJECTION, SOLUTION INTRAVENOUS PRN
Status: DISCONTINUED | OUTPATIENT
Start: 2025-04-09 | End: 2025-04-10

## 2025-04-09 RX ORDER — ONDANSETRON 4 MG/1
4 TABLET, ORALLY DISINTEGRATING ORAL EVERY 8 HOURS PRN
Status: DISCONTINUED | OUTPATIENT
Start: 2025-04-09 | End: 2025-04-11 | Stop reason: HOSPADM

## 2025-04-09 RX ORDER — ACETAMINOPHEN 500 MG
1000 TABLET ORAL
Status: COMPLETED | OUTPATIENT
Start: 2025-04-09 | End: 2025-04-09

## 2025-04-09 RX ADMIN — IOPAMIDOL 75 ML: 755 INJECTION, SOLUTION INTRAVENOUS at 18:28

## 2025-04-09 RX ADMIN — CIPROFLOXACIN 400 MG: 400 INJECTION, SOLUTION INTRAVENOUS at 20:17

## 2025-04-09 RX ADMIN — Medication 5 MG: at 23:43

## 2025-04-09 RX ADMIN — ACETAMINOPHEN 1000 MG: 500 TABLET ORAL at 17:01

## 2025-04-09 RX ADMIN — SODIUM CHLORIDE 1000 ML: 0.9 INJECTION, SOLUTION INTRAVENOUS at 17:00

## 2025-04-09 RX ADMIN — SODIUM CHLORIDE, SODIUM LACTATE, POTASSIUM CHLORIDE, AND CALCIUM CHLORIDE 1000 ML: .6; .31; .03; .02 INJECTION, SOLUTION INTRAVENOUS at 18:20

## 2025-04-09 RX ADMIN — ACETAMINOPHEN 650 MG: 325 TABLET, FILM COATED ORAL at 22:58

## 2025-04-09 RX ADMIN — SODIUM CHLORIDE, PRESERVATIVE FREE 10 ML: 5 INJECTION INTRAVENOUS at 23:03

## 2025-04-09 ASSESSMENT — PAIN DESCRIPTION - PAIN TYPE: TYPE: ACUTE PAIN

## 2025-04-09 ASSESSMENT — PAIN SCALES - GENERAL
PAINLEVEL_OUTOF10: 10
PAINLEVEL_OUTOF10: 10
PAINLEVEL_OUTOF10: 6

## 2025-04-09 ASSESSMENT — PAIN - FUNCTIONAL ASSESSMENT
PAIN_FUNCTIONAL_ASSESSMENT: 0-10
PAIN_FUNCTIONAL_ASSESSMENT: PREVENTS OR INTERFERES SOME ACTIVE ACTIVITIES AND ADLS

## 2025-04-09 ASSESSMENT — PAIN DESCRIPTION - LOCATION
LOCATION: ABDOMEN

## 2025-04-09 ASSESSMENT — PAIN DESCRIPTION - FREQUENCY: FREQUENCY: INTERMITTENT

## 2025-04-09 ASSESSMENT — PAIN DESCRIPTION - ORIENTATION
ORIENTATION: LOWER
ORIENTATION: LOWER

## 2025-04-09 ASSESSMENT — PAIN DESCRIPTION - DESCRIPTORS
DESCRIPTORS: DISCOMFORT
DESCRIPTORS: BURNING;SHARP

## 2025-04-09 NOTE — ED PROVIDER NOTES
THE [facility]  EMERGENCY DEPARTMENT ENCOUNTER          NURSE PRACTITIONER NOTE       Date of evaluation: 4/9/2025    Chief Complaint     Abdominal Pain (Pt. Presents to ED via EMS from home with c/o worsening UTI symptoms including lower abd pain and dysuria. )      History of Present Illness     Shraddha Hanson is a 49 y.o. female with past medical history includes anxiety and recent recurrent UTIs.  Who presents complaining of UTI that is not improving with treatment.  She was seen by urology at the end of March.  4/3/2025 seen here at [facility] and treated for kidney infection with Bactrim.  She states she went to [facility] ED over the past weekend for persisting cystitis.  They did an urine test that did not show any specific infection and encouraged her to continue her current treatment.  She was called later that her urine culture had abnormal growth.  She states she continues to have lower abdominal pain and dysuria.  She has been taking Pyridium for a week.  She is still taking her Bactrim in the middle of course.  She denies vomiting but admits to some nausea.  She has felt warm but no measured temperature.  She denies lower back pain.  She has urology follow-up on Monday 4/14 where she did have cystoscopy performed.    ASSESSMENT / PLAN  (MEDICAL DECISION MAKING)     INITIAL VITALS: BP: 124/71, Temp: 98.3 °F (36.8 °C), Pulse: 96, Respirations: 24, SpO2: 100 %     Briefly, this is a Shraddha Hanson 49 y.o. female with a past medical history   Past Medical History:   Diagnosis Date    Allergic rhinitis     Anxiety     Mild situational    Breast cyst 2019    UTI (urinary tract infection)     who presented to the emergency department with chief complaint of Abdominal Pain (Pt. Presents to ED via EMS from home with c/o worsening UTI symptoms including lower abd pain and dysuria. )      ED Course as of 04/09/25 1936 Wed Apr 09, 2025   1645 49-year-old female here with persistent UTI symptoms and  CONTRAST Additional Contrast? None    (Results Pending)       LABS:   Results for orders placed or performed during the hospital encounter of 04/09/25   CBC with Auto Differential   Result Value Ref Range    WBC 7.0 4.0 - 11.0 K/uL    RBC 4.62 4.00 - 5.20 M/uL    Hemoglobin 14.9 12.0 - 16.0 g/dL    Hematocrit 43.1 36.0 - 48.0 %    MCV 93.4 80.0 - 100.0 fL    MCH 32.2 26.0 - 34.0 pg    MCHC 34.5 31.0 - 36.0 g/dL    RDW 13.0 12.4 - 15.4 %    Platelets 353 135 - 450 K/uL    MPV 8.4 5.0 - 10.5 fL    Neutrophils % 73.8 %    Lymphocytes % 17.9 %    Monocytes % 7.2 %    Eosinophils % 0.2 %    Basophils % 0.9 %    Neutrophils Absolute 5.2 1.7 - 7.7 K/uL    Lymphocytes Absolute 1.3 1.0 - 5.1 K/uL    Monocytes Absolute 0.5 0.0 - 1.3 K/uL    Eosinophils Absolute 0.0 0.0 - 0.6 K/uL    Basophils Absolute 0.1 0.0 - 0.2 K/uL   BMP w/ Reflex to MG   Result Value Ref Range    Sodium 136 136 - 145 mmol/L    Potassium reflex Magnesium 3.9 3.5 - 5.1 mmol/L    Chloride 99 99 - 110 mmol/L    CO2 17 (L) 21 - 32 mmol/L    Anion Gap 20 (H) 3 - 16    Glucose 118 (H) 70 - 99 mg/dL    BUN 17 7 - 20 mg/dL    Creatinine 0.9 0.6 - 1.1 mg/dL    Est, Glom Filt Rate 77 >60    Calcium 10.2 8.3 - 10.6 mg/dL   Lipase   Result Value Ref Range    Lipase 55.0 13.0 - 60.0 U/L   Hepatic Function Panel   Result Value Ref Range    Total Protein 8.0 6.4 - 8.2 g/dL    Albumin 4.8 3.4 - 5.0 g/dL    Alkaline Phosphatase 73 40 - 129 U/L    ALT 14 10 - 40 U/L    AST 24 15 - 37 U/L    Total Bilirubin 0.5 0.0 - 1.0 mg/dL    Bilirubin, Direct 0.2 0.0 - 0.3 mg/dL    Bilirubin, Indirect 0.3 0.0 - 1.0 mg/dL   Lactate, Sepsis   Result Value Ref Range    Lactic Acid, Sepsis 3.3 (H) 0.4 - 1.9 mmol/L   Urinalysis with Reflex to Culture    Specimen: Urine   Result Value Ref Range    Color, UA Yellow Straw/Yellow    Clarity, UA Clear Clear    Glucose, Ur 100 (A) Negative mg/dL    Bilirubin, Urine Negative Negative    Ketones, Urine >=80 (A) Negative mg/dL    Specific Gravity,

## 2025-04-09 NOTE — ED PROVIDER NOTES
THE [facility]  EMERGENCY DEPARTMENT ENCOUNTER          EM RESIDENT NOTE     Date of evaluation: 4/9/2025    ADDENDUM:      Care of this patient was assumed from KT Moreno.  The patient was seen for Abdominal Pain (Pt. Presents to ED via EMS from home with c/o worsening UTI symptoms including lower abd pain and dysuria. )  .  The patient's initial evaluation and plan have been discussed with the prior provider who initially evaluated the patient.  Nursing Notes, Past Medical Hx, Past Surgical Hx, Social Hx, Allergies, and Family Hx were all reviewed.    MEDICAL DECISION MAKING / ASSESSMENT / PLAN     Shraddha Hanson is a 49 y.o. female presenting with abdominal pain. History of recent UTI with abdominal pain and dysuria. Now endorsing worsening pain and new pain in the LLQ. Nausea without vomiting.  She received fluids for dehydration. CT A/P pending at this time.    Upon the reassessment the patient is seen sitting in bed.  She appears to be uncomfortable secondary to her abdominal pain.  Patient's repeat lactate now within normal limits.  Repeat renal panel with significant improvement and her anion gap.  The patient however does not think she will be able to thrive at home given the degree of pain.  Plan to admit the patient to the hospital for further management.  Based on prior urine culture sensitivities Cipro is recommended.  Plan to give her IV Cipro in the ED at this time.  Patient CT abdomen pelvis within normal limits as well.  Patient's urine at bedside appears to have a reddish-orange tinge which is likely from her recent intake of Pyridium and or from her severe dehydration.    Is this patient to be included in the SEP-1 core measure? No Exclusion criteria - the patient is NOT to be included for SEP-1 Core Measure due to: Infection is not suspected    Medical Decision Making  Amount and/or Complexity of Data Reviewed  Labs: ordered.  Radiology: ordered.    Risk  OTC drugs.  Prescription drug          RECENT VITALS:  BP: 104/75, Temp: 98.3 °F (36.8 °C), Pulse: 77, Respirations: 13, SpO2: 93 %     Procedures     None    ED Course     ED Course as of 04/09/25 2010 Wed Apr 09, 2025   1645 49-year-old female here with persistent UTI symptoms and lower abdominal pain.  Currently on treatment with Bactrim which show sensitivity on last culture on 4/1.  She has another culture pending from [facility] shows gram-negative rods but no sensitivities.  Patient reporting discomfort and dysuria persisting.  On exam she does not have any CVA tenderness.  Left lower quadrant is tender.  Vital signs are stable and afebrile.  She has had nausea but no vomiting.  This time we will order basic labs to evaluate electrolytes and kidney function.  Add additional lab score abdominal pain.  IV fluids and Tylenol for pain management. [MR]   1730 Review of labs.  BMP does show anion gap of 20 and CO2 17.  Sodium potassium normal.  Kidney function normal.  Hepatic function panel is normal as well as lipase.  CBC shows no leukocytosis, no anemia, no other remarkable finding.  Lactate is greater than 3.  Suspect BMP changes related to lactate.  Additional fluid and repeat Lactate. [MR]   1819 Due to persisting pain in lower abdomen and LLQ will order CT Abdomen to further evaluate and consider other infectious causes. [MR]   1910 At the end of my shift report was provided to oncoming provider Dr. Elizabeth Braxton.  At this time patient is pending CT abdomen pelvis, repeat lactate, disposition. [MR]      ED Course User Index  [MR] Pablo Moreno, APRN - CNP       The patient was given the following medications:  Orders Placed This Encounter   Medications    sodium chloride 0.9 % bolus 1,000 mL    acetaminophen (TYLENOL) tablet 1,000 mg    lactated ringers bolus 1,000 mL    iopamidol (ISOVUE-370) 76 % injection 75 mL    ciprofloxacin (CIPRO) IVPB 400 mg     Antimicrobial Indications:   Urinary Tract Infection       CONSULTS:  None

## 2025-04-09 NOTE — ED PROVIDER NOTES
ED Attending Attestation Note     Date of evaluation: 4/9/2025    This patient was seen by the advance practice provider.  I have seen and examined the patient, agree with the workup, evaluation, management and diagnosis. The care plan has been discussed.  My assessment reveals patient with recurrent UTIs here with abdominal pain.  Cysto upcoming this week.  Recent urine culture from 4/1 shows ecoli with sensitivity to fluoroquinolones and to a lesser degree macrobid and bactrim, currently on bactrim.  Patient is tender in the suprapubic and left lower abdomen, more so than would be expected for simple UTI.  White count normal.  Lactate elevated at 3.3.  CT scan performed because of the change in her pain recently and this was unremarkable.  Patient appeared to be fairly substantially dehydrated with ketones in the urine as well as elevated lactate which ultimately cleared with 2 fluid boluses.  She is still feeling poorly and does not think she will be able to go home and keep her antibiotics down to feel better.  She will be brought into the hospital for continued IV fluids to treat dehydration as well as IV antibiotics which will be changed to a licha quinolone given the better sensitivity.     Pablo Aquino MD  04/09/25 2016

## 2025-04-10 LAB
ALBUMIN SERPL-MCNC: 4.1 G/DL (ref 3.4–5)
ANION GAP SERPL CALCULATED.3IONS-SCNC: 9 MMOL/L (ref 3–16)
BASOPHILS # BLD: 0 K/UL (ref 0–0.2)
BASOPHILS NFR BLD: 0.9 %
BUN SERPL-MCNC: 11 MG/DL (ref 7–20)
CALCIUM SERPL-MCNC: 9.2 MG/DL (ref 8.3–10.6)
CHLORIDE SERPL-SCNC: 106 MMOL/L (ref 99–110)
CO2 SERPL-SCNC: 25 MMOL/L (ref 21–32)
CREAT SERPL-MCNC: 0.8 MG/DL (ref 0.6–1.1)
CREAT UR-MCNC: 39.8 MG/DL (ref 28–259)
DEPRECATED RDW RBC AUTO: 12.4 % (ref 12.4–15.4)
EOSINOPHIL # BLD: 0.1 K/UL (ref 0–0.6)
EOSINOPHIL NFR BLD: 2 %
GFR SERPLBLD CREATININE-BSD FMLA CKD-EPI: 89 ML/MIN/{1.73_M2}
GLUCOSE SERPL-MCNC: 106 MG/DL (ref 70–99)
HCT VFR BLD AUTO: 36.8 % (ref 36–48)
HGB BLD-MCNC: 12.4 G/DL (ref 12–16)
LYMPHOCYTES # BLD: 0.9 K/UL (ref 1–5.1)
LYMPHOCYTES NFR BLD: 21.5 %
MAGNESIUM SERPL-MCNC: 2.18 MG/DL (ref 1.8–2.4)
MCH RBC QN AUTO: 32.7 PG (ref 26–34)
MCHC RBC AUTO-ENTMCNC: 33.8 G/DL (ref 31–36)
MCV RBC AUTO: 96.6 FL (ref 80–100)
MICROALBUMIN UR DL<=1MG/L-MCNC: <1.2 MG/DL
MICROALBUMIN/CREAT UR: NORMAL MG/G (ref 0–30)
MONOCYTES # BLD: 0.3 K/UL (ref 0–1.3)
MONOCYTES NFR BLD: 8.3 %
NEUTROPHILS # BLD: 2.8 K/UL (ref 1.7–7.7)
NEUTROPHILS NFR BLD: 67.3 %
PHOSPHATE SERPL-MCNC: 3.5 MG/DL (ref 2.5–4.9)
PLATELET # BLD AUTO: 278 K/UL (ref 135–450)
PMV BLD AUTO: 7.9 FL (ref 5–10.5)
POTASSIUM SERPL-SCNC: 4.1 MMOL/L (ref 3.5–5.1)
RBC # BLD AUTO: 3.81 M/UL (ref 4–5.2)
SODIUM SERPL-SCNC: 140 MMOL/L (ref 136–145)
TSH SERPL DL<=0.005 MIU/L-ACNC: 0.33 UIU/ML (ref 0.27–4.2)
URATE SERPL-MCNC: 1.9 MG/DL (ref 2.6–6)
WBC # BLD AUTO: 4.2 K/UL (ref 4–11)

## 2025-04-10 PROCEDURE — 82607 VITAMIN B-12: CPT

## 2025-04-10 PROCEDURE — 1200000000 HC SEMI PRIVATE

## 2025-04-10 PROCEDURE — 80069 RENAL FUNCTION PANEL: CPT

## 2025-04-10 PROCEDURE — 87086 URINE CULTURE/COLONY COUNT: CPT

## 2025-04-10 PROCEDURE — 82570 ASSAY OF URINE CREATININE: CPT

## 2025-04-10 PROCEDURE — G0378 HOSPITAL OBSERVATION PER HR: HCPCS

## 2025-04-10 PROCEDURE — 84550 ASSAY OF BLOOD/URIC ACID: CPT

## 2025-04-10 PROCEDURE — 51798 US URINE CAPACITY MEASURE: CPT

## 2025-04-10 PROCEDURE — 90791 PSYCH DIAGNOSTIC EVALUATION: CPT | Performed by: PSYCHOLOGIST

## 2025-04-10 PROCEDURE — 83735 ASSAY OF MAGNESIUM: CPT

## 2025-04-10 PROCEDURE — 6370000000 HC RX 637 (ALT 250 FOR IP)

## 2025-04-10 PROCEDURE — 2500000003 HC RX 250 WO HCPCS

## 2025-04-10 PROCEDURE — 83036 HEMOGLOBIN GLYCOSYLATED A1C: CPT

## 2025-04-10 PROCEDURE — 84443 ASSAY THYROID STIM HORMONE: CPT

## 2025-04-10 PROCEDURE — 82043 UR ALBUMIN QUANTITATIVE: CPT

## 2025-04-10 PROCEDURE — 6370000000 HC RX 637 (ALT 250 FOR IP): Performed by: INTERNAL MEDICINE

## 2025-04-10 PROCEDURE — 82746 ASSAY OF FOLIC ACID SERUM: CPT

## 2025-04-10 PROCEDURE — 36415 COLL VENOUS BLD VENIPUNCTURE: CPT

## 2025-04-10 PROCEDURE — 85025 COMPLETE CBC W/AUTO DIFF WBC: CPT

## 2025-04-10 RX ORDER — FLUOXETINE 10 MG/1
10 CAPSULE ORAL DAILY
Status: DISCONTINUED | OUTPATIENT
Start: 2025-04-10 | End: 2025-04-11 | Stop reason: HOSPADM

## 2025-04-10 RX ORDER — LIDOCAINE 4 G/G
1 PATCH TOPICAL DAILY
Status: DISCONTINUED | OUTPATIENT
Start: 2025-04-10 | End: 2025-04-11 | Stop reason: HOSPADM

## 2025-04-10 RX ORDER — ESTRADIOL 0.1 MG/G
1 CREAM VAGINAL
Status: DISCONTINUED | OUTPATIENT
Start: 2025-04-11 | End: 2025-04-11 | Stop reason: HOSPADM

## 2025-04-10 RX ORDER — CETIRIZINE HYDROCHLORIDE 10 MG/1
10 TABLET ORAL DAILY
Status: DISCONTINUED | OUTPATIENT
Start: 2025-04-10 | End: 2025-04-11 | Stop reason: HOSPADM

## 2025-04-10 RX ORDER — PHENAZOPYRIDINE HYDROCHLORIDE 200 MG/1
200 TABLET, FILM COATED ORAL 3 TIMES DAILY PRN
Status: DISCONTINUED | OUTPATIENT
Start: 2025-04-10 | End: 2025-04-11 | Stop reason: HOSPADM

## 2025-04-10 RX ADMIN — CETIRIZINE HYDROCHLORIDE 10 MG: 10 TABLET, FILM COATED ORAL at 12:49

## 2025-04-10 RX ADMIN — FLUOXETINE HYDROCHLORIDE 10 MG: 10 CAPSULE ORAL at 12:50

## 2025-04-10 RX ADMIN — ACETAMINOPHEN 650 MG: 325 TABLET, FILM COATED ORAL at 05:00

## 2025-04-10 RX ADMIN — ACETAMINOPHEN 650 MG: 325 TABLET, FILM COATED ORAL at 22:55

## 2025-04-10 RX ADMIN — SODIUM CHLORIDE, PRESERVATIVE FREE 10 ML: 5 INJECTION INTRAVENOUS at 22:49

## 2025-04-10 RX ADMIN — ACETAMINOPHEN 1000 MG: 500 TABLET ORAL at 17:39

## 2025-04-10 RX ADMIN — PHENAZOPYRIDINE 200 MG: 200 TABLET ORAL at 13:00

## 2025-04-10 RX ADMIN — Medication 5 MG: at 22:48

## 2025-04-10 RX ADMIN — PHENAZOPYRIDINE 200 MG: 200 TABLET ORAL at 22:48

## 2025-04-10 RX ADMIN — SODIUM CHLORIDE, PRESERVATIVE FREE 10 ML: 5 INJECTION INTRAVENOUS at 09:43

## 2025-04-10 RX ADMIN — ACETAMINOPHEN 1000 MG: 500 TABLET ORAL at 09:42

## 2025-04-10 ASSESSMENT — PAIN SCALES - GENERAL
PAINLEVEL_OUTOF10: 6
PAINLEVEL_OUTOF10: 4
PAINLEVEL_OUTOF10: 3
PAINLEVEL_OUTOF10: 3
PAINLEVEL_OUTOF10: 4

## 2025-04-10 ASSESSMENT — PAIN - FUNCTIONAL ASSESSMENT
PAIN_FUNCTIONAL_ASSESSMENT: PREVENTS OR INTERFERES SOME ACTIVE ACTIVITIES AND ADLS

## 2025-04-10 ASSESSMENT — PAIN DESCRIPTION - LOCATION
LOCATION: ABDOMEN

## 2025-04-10 ASSESSMENT — PAIN DESCRIPTION - ORIENTATION
ORIENTATION: MID;LOWER
ORIENTATION: LOWER;MID
ORIENTATION: LOWER;MID
ORIENTATION: MID;LOWER

## 2025-04-10 ASSESSMENT — PAIN DESCRIPTION - DIRECTION: RADIATING_TOWARDS: /A

## 2025-04-10 ASSESSMENT — PAIN DESCRIPTION - PAIN TYPE
TYPE: ACUTE PAIN

## 2025-04-10 ASSESSMENT — PAIN DESCRIPTION - FREQUENCY
FREQUENCY: INTERMITTENT
FREQUENCY: INTERMITTENT

## 2025-04-10 ASSESSMENT — PAIN DESCRIPTION - ONSET: ONSET: ON-GOING

## 2025-04-10 ASSESSMENT — PAIN DESCRIPTION - DESCRIPTORS
DESCRIPTORS: DISCOMFORT
DESCRIPTORS: STABBING;BURNING
DESCRIPTORS: DISCOMFORT;SHARP;STABBING
DESCRIPTORS: BURNING;STABBING
DESCRIPTORS: STABBING;DISCOMFORT

## 2025-04-10 NOTE — PLAN OF CARE
Problem: Safety - Adult  Goal: Free from fall injury  Outcome: Progressing  Note:  Remains free from falls, ambulates independently to perform ADLs.     Problem: Pain  Goal: Verbalizes/displays adequate comfort level or baseline comfort level  Outcome: Progressing  Note:  PRN Tylenol 650 mg PO at 2258 for lower abdominal pain.     Problem: Respiratory - Adult  Goal: Achieves optimal ventilation and oxygenation  Outcome: Progressing  Note:  O2 97% on Room Air.     Problem: Cardiovascular - Adult  Goal: Maintains optimal cardiac output and hemodynamic stability  Outcome: Progressing  Note:  Vital signs stable.

## 2025-04-10 NOTE — PLAN OF CARE
[facility] Hospital[facility] brief note  Consult received.  Case reviewed with ER physician- UTI    Full note to follow.    Frances Spence MD    Thanks  CHYNA CEE MD

## 2025-04-10 NOTE — PROGRESS NOTES
Patient declined admission questions to facilitate sleep and to answer in the day time during normal hours when awake.

## 2025-04-10 NOTE — PROGRESS NOTES
D:  Please note, patient is c/o insomnia noting she takes Melatonin 5 mg at home to help facilitate sleep.  May we please have an order?  Please advise.  Thank you.  A:  Notified provider, Prince Altagracia Portillo MD.  R:  Per provider above ok to, \"Put order in due to away from PC.\"

## 2025-04-10 NOTE — CARE COORDINATION
Case Management Assessment  Initial Evaluation    Date/Time of Evaluation: 4/10/2025 11:05 AM  Assessment Completed by: Marguerite Bui RN    If patient is discharged prior to next notation, then this note serves as note for discharge by case management.    Patient Name: Shraddha Hanson                   YOB: 1975  Diagnosis: Dysuria [R30.0]  UTI (urinary tract infection) [N39.0]  Suprapubic pain [R10.2]                   Date / Time: 4/9/2025  4:26 PM    Patient Admission Status: Inpatient   Readmission Risk (Low < 19, Mod (19-27), High > 27): Readmission Risk Score: 10.8    Current PCP: Frances Spence MD  PCP verified by CM? Yes    Chart Reviewed: Yes      History Provided by: Patient  Patient Orientation: Alert and Oriented, Person, Place    Patient Cognition: Alert    Hospitalization in the last 30 days (Readmission):  No    If yes, Readmission Assessment in  Navigator will be completed.    Advance Directives:      Code Status: Full Code   Patient's Primary Decision Maker is: Legal Next of Kin      Discharge Planning:    Patient lives with: Alone Type of Home: House  Primary Care Giver: Self  Patient Support Systems include: Parent, Family Members   Current Financial resources: None  Current community resources: Psychiatric Treatment  Current services prior to admission:              Current DME:              Type of Home Care services:       ADLS  Prior functional level: Independent in ADLs/IADLs  Current functional level: Independent in ADLs/IADLs    PT AM-PAC:   /24  OT AM-PAC:   /24    Family can provide assistance at DC: No  Would you like Case Management to discuss the discharge plan with any other family members/significant others, and if so, who? No  Plans to Return to Present Housing: Yes  Other Identified Issues/Barriers to RETURNING to current housing: NA  Potential Assistance needed at discharge:              Potential DME:    Patient expects to discharge to:    Plan for

## 2025-04-10 NOTE — CONSULTS
Psychology Consultation    Patient Name: Shraddha Hanson  MRN: [id number]  Admission Date: 4/9/2025  Today's date: 4/10/2025    Reason for Consult:     significant recent stressors including ill father and recent, new health problems       HPI:   Shraddha Hanson is a 49 y.o.  female with PMHx of anxiety who initially presented on 4/9/2025 for lower abdominal pain. Patient has been evaluated for possible interstitial cystitis during this admission.    Subjective History:    Patient reports increasingly low mood over the last 3 to 4 months and struggling with pelvic pain and frequent urination.  Patient states that she has struggled with anxiety throughout her life, but is feeling very overwhelmed related to this pain and other psychosocial stressors.  States that her father has dementia and she is having to move out of her rental. Reports that she does not see her daughters much because one is studying abroad and the other lives in [place].    Collateral information: None    Current psychiatric treatment: Prozac 20mg (started one week ago), melatonin 5mg    Psychiatric History:    Past psychiatric treatment: therapy in 2019 after divorce (was helpful but did not process her childhood like she hoped)    Depression: States depressed mood over the past 3-4 months. Depressive symptoms include depressed mood, hopeless, worthlessness, insomnia, decreased appetite, and thoughts of death. Denies depression prior to this.  Anxiety: States anxiety throughout her life. Reports that she worries about finances, children, did worry about her marriage, now worries about her health. Patient thinks about things that she has to do. Endorses excessive worry, uncontrollable worry, sleep disturbance, difficulties with focus.  SI/HI: States that if this is her quality of life, she wonders if it is worth living. Denies suicidal ideation, intent, or plan. Denies having ideas as to how long symptoms much persist before she

## 2025-04-10 NOTE — H&P
Internal Medicine H&P    Date: 2025   Patient: Shraddha Hanson   Patient : 1975     CC: Abdominal Pain (Pt. Presents to ED via EMS from home with c/o worsening UTI symptoms including lower abd pain and dysuria. )       Subjective     HPI: Ms. Hanson is a 48 yo female with no significant pmhx presents to the ED for chief complaint of lower abdominal pain. Patient has been having ongoing symptoms of dysuria, lower abdominal pain, bladder spasms, and urgency for several months now starting in January. She has gone to multiple providers such as the ED, her PCP, and Gynecologist and has been prescribed numerous antibiotics and pyridium without much relief. During this time period there has been some evidence on UA that she is actually having UTIs but not strong evidence. Patient has developed anxiety and frustration due to being in pain and has a hard time working and caring for her dogs. Since starting all of the abx, she has had decreased appetite and has to force herself to eat. She reports that her symptoms are more severe after not urinating for several hours and her pain is mildly relieved by urinating. She has tried many different NSAIDs without significant relief. She denies any burning while urination, incontinence, blood in urine, vaginal bleeding, or flank pain. Today she decided to come to ED because she was feeling slightly dizzy, lightheaded, and felt a tingling sensation in her hands and feet. She reported cessation of these symptoms after arriving to hospital.    Patient reports that she had overflow incontinence years ago and was taking a medication for it but this was many years ago.    On presentation to ED, VSS    ED Course:  Labs: HCO3 17, AG 20, Lactic Acid 3.3.  Imaging: CT abd/pelvis unremarkable   Treatment: Ciprofloxacin, 1L bolus    PMHx:      Diagnosis Date    Allergic rhinitis     Anxiety     Mild situational    Breast cyst 2019    UTI (urinary tract infection)

## 2025-04-10 NOTE — CONSULTS
Urology Attending Consult Note      Reason for Consultation: Concern for IC    History: 50yo F with 3-4 month history of lower abdominal pain, bladder spasms and urgency and possible Viviana. From her records I see a few positive Ecoli cultures and most recently on 25 culture showing GNR. She saw us in the office 1 week ago for visit. We ordered a PCR, started her on Gemtesa for OAB symptoms and ordered a cystoscopy which is on the schedule this upcoming Monday. She has also been taking Estrace cream prescribed by her GYN. She came to the ER last night with abdominal pain. CT scan did not reveal any acute abnormalities. Bloodwork was unremarkable, vitals stable. UA not showing infection but she was on Bactrim prior to presentation.  She was admitted for further management.     Family History, Social History, Review of Systems:  Reviewed and agreed to as per chart    Vitals:  /68   Pulse 85   Temp 98.4 °F (36.9 °C) (Oral)   Resp 18   Ht 1.676 m (5' 6\")   Wt 59 kg (130 lb 1.1 oz)   SpO2 98%   BMI 20.99 kg/m²   Temp  Av.2 °F (36.8 °C)  Min: 97.7 °F (36.5 °C)  Max: 98.4 °F (36.9 °C)    Intake/Output Summary (Last 24 hours) at 4/10/2025 1013  Last data filed at 2025 2300  Gross per 24 hour   Intake 2120 ml   Output --   Net 2120 ml         Physical:  Well developed, well nourished. Appears upset  Mood indicates no abnormalities. Pt doesn’t appear depressed  Orientated to time and place  Neck is supple, trachea is midline  Respiratory effort is normal        Labs:  WBC:    Lab Results   Component Value Date/Time    WBC 4.2 04/10/2025 04:45 AM     Hemoglobin/Hematocrit:    Lab Results   Component Value Date/Time    HGB 12.4 04/10/2025 04:45 AM    HCT 36.8 04/10/2025 04:45 AM     BMP:    Lab Results   Component Value Date/Time     04/10/2025 04:45 AM    K 4.1 04/10/2025 04:45 AM    K 3.9 2025 05:11 PM     04/10/2025 04:45 AM    CO2 25 04/10/2025 04:45 AM    BUN 11 04/10/2025

## 2025-04-10 NOTE — PROGRESS NOTES
Internal Medicine Progress Note    Patient: Shraddha Hanson   : 1975   Admit Date: 2025     Date: 4/10/2025     Interval History     No acute overnight events. Hemodynamically stable. Denies acute complaints including chest pain, SOB and abdominal pain. Patient is tearful noting her pain and family stressors (dad having worsening dementia).    Notes mild suprapubic pain that has improved overnight. Received scheduled and PRN tylenol.      ROS     As noted above    Objective     I/Os:  I/O last 3 completed shifts:  In:  [P.O.:120; IV Piggyback:]  Out: -       Vital Signs:  Patient Vitals for the past 5 hrs:   BP Temp Temp src Pulse Resp SpO2 Weight   04/10/25 0850 111/68 98.4 °F (36.9 °C) Oral 85 18 98 % --   04/10/25 0502 122/75 97.7 °F (36.5 °C) Oral 70 16 98 % 59 kg (130 lb 1.1 oz)       Physical Exam:  Physical Exam  Constitutional:       General: She is not in acute distress.     Appearance: Normal appearance. She is ill-appearing.   HENT:      Head: Normocephalic and atraumatic.      Nose: Nose normal.   Eyes:      Extraocular Movements: Extraocular movements intact.   Cardiovascular:      Rate and Rhythm: Normal rate and regular rhythm.      Heart sounds: Normal heart sounds. No murmur heard.  Pulmonary:      Effort: No respiratory distress.      Breath sounds: Normal breath sounds. No wheezing.   Abdominal:      General: There is no distension.      Palpations: Abdomen is soft.      Tenderness: There is abdominal tenderness.      Comments: Mild suprapubic tenderness on palpation   Musculoskeletal:      Right lower leg: No edema.   Skin:     General: Skin is warm.   Neurological:      Mental Status: She is alert and oriented to person, place, and time.      Comments: CN grossly intact         Medications:   lidocaine  1 patch TransDERmal Daily    sodium chloride flush  5-40 mL IntraVENous 2 times per day    enoxaparin  40 mg SubCUTAneous Daily    acetaminophen  1,000 mg Oral TID     Amitriptyline is considered first line therapy, however, patient just started Fluoxetine and I do not want to cause any interaction. I have counseled her on bladder training and lifestyle modifications to implement. Pt has already initiated outpatient urology consult and has plans for uroscopy on Monday.  - Scheduled tylenol 1g TID  - Hold/cold compress  - Supportive care  - C/s urology    Anxiety  Patient appearing tearful following admission and stating multiple stressors including taking care of her dogs and her dad who has dementia. Recently started on Prozac on outpatient visit  - continue home prozac  - c/s psychology    Glucosuria  Patient w/o hx of diabetes presenting with glucosuria which appears to be persistent on prior labs. No electrolyte abnormalities    Lactic acidosis (resolved)  Present in the ED. Resolved with 2L fluid bolus    DVT PPx:  Diet:  ADULT DIET; Regular   Code status:  Full Code     ELOS: 2  Barriers to discharge: interstitial cystitis  Disposition  - Preadmission: home  - Current: 6S  - Upon discharge: TBD    Will discuss with attending physician Aparna Hamm MD.     Zheng Casiano MD, PGY-1  Internal Medicine Resident  Contact via [facility]

## 2025-04-10 NOTE — ED NOTES
Patient Name: Shraddha Hanson  : 1975 49 y.o.  MRN: [id number]  ED Room #: A10/A10-10     Chief complaint:   Chief Complaint   Patient presents with    Abdominal Pain     Pt. Presents to ED via EMS from home with c/o worsening UTI symptoms including lower abd pain and dysuria.      Hospital Problem/Diagnosis:   Hospital Problems           Last Modified POA    * (Principal) UTI (urinary tract infection) 2025 Yes         O2 Flow Rate:O2 Device: None (Room air)   (if applicable)  Cardiac Rhythm:   (if applicable)  Active LDA's:   Peripheral IV 25 Right Forearm (Active)   Site Assessment Clean, dry & intact 25 1638   Line Status Normal saline locked;Specimen collected 25 163   Phlebitis Assessment No symptoms 25 1638   Infiltration Assessment 0 25 1638   Dressing Status New dressing applied 25 1638   Dressing Intervention New 25 1638      External Urinary Catheter (Active)   Site Assessment Clean,dry & intact 25 1713   Placement Initiated 25 1713          How does patient ambulate? Low Fall Risk (Ambulates by themselves without support    2. How does patient take pills? Whole with Water    3. Is patient alert? Alert    4. Is patient oriented? To Person, To Place, To Time, To Situation, and Follows Commands    5.   Patient arrived from:  home  Facility Name: ___________________________________________    6. If patient is disoriented or from a Skill Nursing Facility has family been notified of admission?     7. Patient belongings?     Disposition of belongings?      8. Any specific patient or family belongings/needs/dynamics?   a.     9. Miscellaneous comments/pending orders?  a.       If there are any additional questions please reach out to the Emergency Department.       Sherron Moreno RN  25

## 2025-04-10 NOTE — PROGRESS NOTES
4 Eyes Admission Assessment     I agree as the admission nurse that 2 RN's have performed a thorough Head to Toe Skin Assessment on the patient. ALL assessment sites listed below have been assessed on admission.       Areas assessed by both nurses: Mandie Mello and Blane Sheikh, RNs  [x]   Head, Face, and Ears   [x]   Shoulders, Back, and Chest  [x]   Arms, Elbows, and Hands   [x]   Coccyx, Sacrum, and Ischium  [x]   Legs, Feet, and Heels        Does the Patient have Skin Breakdown?  No         Jamal Prevention initiated:  Yes   Wound Care Orders initiated:  NA      [facility] nurse consulted for Pressure Injury (Stage 3,4, Unstageable, DTI, NWPT, and Complex wounds) or Jamal score 18 or lower:  NA      Nurse 1 eSignature: Electronically signed by Mandie Mello RN on 4/10/25 at 3:43 AM EDT    **SHARE this note so that the co-signing nurse is able to place an eSignature**    Nurse 2 eSignature: {Esignature:[id number]}

## 2025-04-11 VITALS
BODY MASS INDEX: 20.9 KG/M2 | OXYGEN SATURATION: 97 % | WEIGHT: 130.07 LBS | TEMPERATURE: 97.5 F | DIASTOLIC BLOOD PRESSURE: 85 MMHG | HEIGHT: 66 IN | RESPIRATION RATE: 18 BRPM | HEART RATE: 77 BPM | SYSTOLIC BLOOD PRESSURE: 118 MMHG

## 2025-04-11 LAB
ALBUMIN SERPL-MCNC: 3.9 G/DL (ref 3.4–5)
ANION GAP SERPL CALCULATED.3IONS-SCNC: 10 MMOL/L (ref 3–16)
BACTERIA UR CULT: NORMAL
BASOPHILS # BLD: 0.1 K/UL (ref 0–0.2)
BASOPHILS NFR BLD: 1.3 %
BUN SERPL-MCNC: 14 MG/DL (ref 7–20)
CALCIUM SERPL-MCNC: 9.3 MG/DL (ref 8.3–10.6)
CHLORIDE SERPL-SCNC: 106 MMOL/L (ref 99–110)
CO2 SERPL-SCNC: 24 MMOL/L (ref 21–32)
CREAT SERPL-MCNC: 0.7 MG/DL (ref 0.6–1.1)
DEPRECATED RDW RBC AUTO: 13 % (ref 12.4–15.4)
EOSINOPHIL # BLD: 0.2 K/UL (ref 0–0.6)
EOSINOPHIL NFR BLD: 4.3 %
EST. AVERAGE GLUCOSE BLD GHB EST-MCNC: 102.5 MG/DL
FOLATE SERPL-MCNC: 5.7 NG/ML (ref 4.78–24.2)
GFR SERPLBLD CREATININE-BSD FMLA CKD-EPI: >90 ML/MIN/{1.73_M2}
GLUCOSE SERPL-MCNC: 93 MG/DL (ref 70–99)
HBA1C MFR BLD: 5.2 %
HCT VFR BLD AUTO: 37.5 % (ref 36–48)
HGB BLD-MCNC: 12.4 G/DL (ref 12–16)
LYMPHOCYTES # BLD: 1.4 K/UL (ref 1–5.1)
LYMPHOCYTES NFR BLD: 33.5 %
MAGNESIUM SERPL-MCNC: 2.12 MG/DL (ref 1.8–2.4)
MCH RBC QN AUTO: 32.1 PG (ref 26–34)
MCHC RBC AUTO-ENTMCNC: 33.1 G/DL (ref 31–36)
MCV RBC AUTO: 96.9 FL (ref 80–100)
MONOCYTES # BLD: 0.3 K/UL (ref 0–1.3)
MONOCYTES NFR BLD: 8.2 %
NEUTROPHILS # BLD: 2.2 K/UL (ref 1.7–7.7)
NEUTROPHILS NFR BLD: 52.7 %
PHOSPHATE SERPL-MCNC: 4 MG/DL (ref 2.5–4.9)
PLATELET # BLD AUTO: 268 K/UL (ref 135–450)
PMV BLD AUTO: 7.9 FL (ref 5–10.5)
POTASSIUM SERPL-SCNC: 3.9 MMOL/L (ref 3.5–5.1)
RBC # BLD AUTO: 3.87 M/UL (ref 4–5.2)
SODIUM SERPL-SCNC: 140 MMOL/L (ref 136–145)
VIT B12 SERPL-MCNC: 218 PG/ML (ref 211–911)
WBC # BLD AUTO: 4.2 K/UL (ref 4–11)

## 2025-04-11 PROCEDURE — 80069 RENAL FUNCTION PANEL: CPT

## 2025-04-11 PROCEDURE — 51798 US URINE CAPACITY MEASURE: CPT

## 2025-04-11 PROCEDURE — 6370000000 HC RX 637 (ALT 250 FOR IP)

## 2025-04-11 PROCEDURE — 90832 PSYTX W PT 30 MINUTES: CPT | Performed by: PSYCHOLOGIST

## 2025-04-11 PROCEDURE — 36415 COLL VENOUS BLD VENIPUNCTURE: CPT

## 2025-04-11 PROCEDURE — 85025 COMPLETE CBC W/AUTO DIFF WBC: CPT

## 2025-04-11 PROCEDURE — 83735 ASSAY OF MAGNESIUM: CPT

## 2025-04-11 PROCEDURE — G0378 HOSPITAL OBSERVATION PER HR: HCPCS

## 2025-04-11 PROCEDURE — 6370000000 HC RX 637 (ALT 250 FOR IP): Performed by: INTERNAL MEDICINE

## 2025-04-11 PROCEDURE — 2500000003 HC RX 250 WO HCPCS

## 2025-04-11 RX ORDER — HYDROXYZINE HYDROCHLORIDE 25 MG/1
25 TABLET, FILM COATED ORAL EVERY 8 HOURS PRN
Qty: 30 TABLET | Refills: 0 | Status: SHIPPED | OUTPATIENT
Start: 2025-04-11 | End: 2025-04-21

## 2025-04-11 RX ORDER — AMITRIPTYLINE HYDROCHLORIDE 10 MG/1
10 TABLET ORAL NIGHTLY
Qty: 30 TABLET | Refills: 0 | Status: SHIPPED | OUTPATIENT
Start: 2025-04-11

## 2025-04-11 RX ORDER — VIBEGRON 75 MG/1
75 TABLET, FILM COATED ORAL DAILY
Qty: 30 TABLET | Refills: 0 | Status: SHIPPED | OUTPATIENT
Start: 2025-04-11

## 2025-04-11 RX ORDER — LIDOCAINE 4 G/G
1 PATCH TOPICAL DAILY PRN
Qty: 10 EACH | Refills: 0 | Status: SHIPPED | OUTPATIENT
Start: 2025-04-11

## 2025-04-11 RX ADMIN — ACETAMINOPHEN 1000 MG: 500 TABLET ORAL at 07:56

## 2025-04-11 RX ADMIN — SODIUM CHLORIDE, PRESERVATIVE FREE 5 ML: 5 INJECTION INTRAVENOUS at 07:58

## 2025-04-11 RX ADMIN — CETIRIZINE HYDROCHLORIDE 10 MG: 10 TABLET, FILM COATED ORAL at 07:57

## 2025-04-11 RX ADMIN — FLUOXETINE HYDROCHLORIDE 10 MG: 10 CAPSULE ORAL at 07:57

## 2025-04-11 ASSESSMENT — PAIN DESCRIPTION - LOCATION: LOCATION: ABDOMEN

## 2025-04-11 ASSESSMENT — PAIN DESCRIPTION - DESCRIPTORS: DESCRIPTORS: DISCOMFORT

## 2025-04-11 ASSESSMENT — PAIN DESCRIPTION - FREQUENCY: FREQUENCY: INTERMITTENT

## 2025-04-11 ASSESSMENT — PAIN SCALES - GENERAL: PAINLEVEL_OUTOF10: 3

## 2025-04-11 ASSESSMENT — PAIN - FUNCTIONAL ASSESSMENT: PAIN_FUNCTIONAL_ASSESSMENT: PREVENTS OR INTERFERES SOME ACTIVE ACTIVITIES AND ADLS

## 2025-04-11 ASSESSMENT — PAIN DESCRIPTION - ORIENTATION: ORIENTATION: MID;LOWER

## 2025-04-11 ASSESSMENT — PAIN DESCRIPTION - PAIN TYPE: TYPE: ACUTE PAIN

## 2025-04-11 ASSESSMENT — PAIN DESCRIPTION - ONSET: ONSET: ON-GOING
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
## 2025-04-11 NOTE — PROGRESS NOTES
Urology Attending Progress Note      Subjective: Pain better controlled. No specific voiding complaints    Vitals:  /85   Pulse 77   Temp 97.5 °F (36.4 °C) (Oral)   Resp 18   Ht 1.676 m (5' 6\")   Wt 59 kg (130 lb 1.1 oz)   SpO2 97%   BMI 20.99 kg/m²   Temp  Av °F (36.7 °C)  Min: 97.5 °F (36.4 °C)  Max: 98.8 °F (37.1 °C)    Intake/Output Summary (Last 24 hours) at 2025 0855  Last data filed at 4/10/2025 1742  Gross per 24 hour   Intake --   Output 800 ml   Net -800 ml       Exam: Sitting up in bed, eating breakfast. Appears comfortable    Labs:  WBC:    Lab Results   Component Value Date/Time    WBC 4.2 2025 04:50 AM     Hemoglobin/Hematocrit:    Lab Results   Component Value Date/Time    HGB 12.4 2025 04:50 AM    HCT 37.5 2025 04:50 AM     BMP:    Lab Results   Component Value Date/Time     2025 04:50 AM    K 3.9 2025 04:50 AM    K 3.9 2025 05:11 PM     2025 04:50 AM    CO2 24 2025 04:50 AM    BUN 14 2025 04:50 AM    CREATININE 0.7 2025 04:50 AM    CALCIUM 9.3 2025 04:50 AM    LABGLOM >90 2025 04:50 AM    LABGLOM >60 2023 08:04 AM     PT/INR:  No results found for: \"PROTIME\", \"INR\"  PTT:  No results found for: \"APTT\"[APTT    Urine Culture:  NGTD      Impression/Plan: 50yo F admitted with abdominal pain. She is being worked up in our office for Viviana and possible IC.     -Pain better controlled today. No voiding complaints  -CT scan without abnormality  -Urine culture NGTD  -From our standpoint she can be discharged. She can proceed with cystoscopy Monday now that culture is NGTD  -Please call with any questions    KT Rodarte

## 2025-04-11 NOTE — CARE COORDINATION
Case Management Assessment            Discharge Note                    Date / Time of Note: 4/11/2025 11:05 AM                  Discharge Note Completed by: Marguerite Bui RN    Patient Name: Shraddha Hanson   YOB: 1975  Diagnosis: Dysuria [R30.0]  UTI (urinary tract infection) [N39.0]  Suprapubic pain [R10.2]   Date / Time: 4/9/2025  4:26 PM    Current PCP: Frances Spence MD  Clinic patient: No    Hospitalization in the last 30 days: No       Advance Directives:  Code Status: Full Code  [place] DNR form completed and on chart: No    Financial:  Payor: UMR / Plan: UMR / Product Type: *No Product type* /      Pharmacy:    [id number] - [place], [place] - [address] [phone] - F [phone] [address] [address]  Phone: [phone] Fax: [phone]      Assistance purchasing medications?:    Assistance provided by Case Management: None at this time    Does patient want to participate in local refill/ meds to beds program?: Yes    Meds To Beds General Rules:  1. Can ONLY be done Monday- Friday between 8:30am-5pm  2. Prescription(s) must be in pharmacy by 3pm to be filled same day  3.Copy of patient's insurance/ prescription drug card and patient face sheet must be sent along with the prescription(s)  4. Cost of Rx cannot be added to hospital bill. If financial assistance is needed, please contact unit  or ;  or  CANNOT provide pharmacy voucher for patients co-pays  5. Patients can then  the prescription on their way out of the hospital at discharge, or pharmacy can deliver to the bedside if staff is available. (payment due at time of pick-up or delivery - cash, check, or card accepted)     Able to afford home medications/ co-pay costs: Yes    ADLS:  Current PT AM-PAC Score:   /24  Current OT AM-PAC Score:   /24    DISCHARGE Disposition: Home- No Services Needed    LOC at discharge: Not

## 2025-04-11 NOTE — PROGRESS NOTES
Patient IV removed prior to discharge. AVS instructions discussed with patient and patient aware to  new medications from [facility] pharmacy. All belongings sent with patient and patient was picked up by friend at 1145am.

## 2025-04-11 NOTE — DISCHARGE SUMMARY
INTERNAL MEDICINE DEPARTMENT AT THE [facility]  DISCHARGE SUMMARY    Patient ID: Shraddha Hanson                                             Discharge Date: 4/11/2025   Patient's PCP: Frances Spence MD                                          Discharge Physician: Aparna Das MD  Admit Date: 4/9/2025   Admitting Physician: Adelfo Rodriguez MD    PROBLEMS DURING HOSPITALIZATION:  Present on Admission:   cystitis     HPI:  Ms. Hanson is a 48 yo female without significant pmhx presents to the ED for chief complaint of lower abdominal pain. Patient has been having ongoing symptoms of dysuria, lower abdominal pain, bladder spasms, and urgency for several months now starting in January. She has gone to multiple providers such as the ED, her PCP, and Gynecologist and has been prescribed numerous antibiotics and pyridium without much relief. During this time period there has been some evidence on UA that she is actually having UTIs but not strong evidence. Patient has developed anxiety and frustration due to being in pain and has a hard time working and caring for her dogs. Since starting all of the abx, she has had decreased appetite and has to force herself to eat. She reports that her symptoms are more severe after not urinating for several hours and her pain is mildly relieved by urinating. She has tried many different NSAIDs without significant relief. She denies any burning while urination, incontinence, blood in urine, vaginal bleeding, or flank pain. Today she decided to come to ED because she was feeling slightly dizzy, lightheaded, and felt a tingling sensation in her hands and feet. She reported cessation of these symptoms after arriving to hospital. Patient reports that she had overflow incontinence years ago and was taking a medication for it but this was many years ago.    The following issues were addressed during hospitalization:    Recurrent suprapubic discomfort suspected 2/2 Interstitial  these medications      Xyzal Allergy 24HR 5 MG tablet  Generic drug: levocetirizine               Where to Get Your Medications        These medications were sent to [facility]/pharmacy #[id number] - [place], [place] - [address] - P [phone] - F [phone] [address] [facility] [address]      Phone: [phone]   amitriptyline 10 MG tablet  Gemtesa 75 MG Tabs tablet  lidocaine 4 % external patch       Activity: activity as tolerated  Diet: regular diet  Wound Care: none needed    At the time of discharge, patient reported feeling stable. They were not in acute distress and vital signs were within normal limits. Further, the patient expressed appropriate understanding of, and agreement with, the discharge recommendations, medications, and plan. Patient was advised to seek immediate attention or to call 911 if they experience recurrence or worsening of symptoms.     Signed:  Sherrill Crain MD, PGY-2  4/11/2025

## 2025-04-11 NOTE — PROGRESS NOTES
Internal Medicine Progress Note    Date: 4/11/2025   Patient: [facility] Day: 2    CC: Abdominal Pain (Pt. Presents to ED via EMS from home with c/o worsening UTI symptoms including lower abd pain and dysuria. )     Interval Hx   Doing well. Cystitis has improved somewhat and she has had intermittent episodes of urination without burning. No new complaints or concerns.She would really like to go home.     Objective     Vital Signs:  Patient Vitals for the past 8 hrs:   BP Temp Temp src Pulse Resp SpO2   04/11/25 0801 118/85 97.5 °F (36.4 °C) Oral 77 18 97 %   04/11/25 0457 107/69 97.5 °F (36.4 °C) Oral 74 16 98 %     Physical Exam  Constitutional:       Appearance: She is normal weight. She is not ill-appearing or diaphoretic.      Comments: Very well appearing but slightly anxious appearing adult female pt is sitting up in the bed in NAD   Eyes:      General: No scleral icterus.  Cardiovascular:      Rate and Rhythm: Normal rate and regular rhythm.   Pulmonary:      Effort: Pulmonary effort is normal. No respiratory distress.   Musculoskeletal:      Right lower leg: No edema.      Left lower leg: No edema.   Skin:     General: Skin is warm.   Neurological:      General: No focal deficit present.      Mental Status: She is alert.   Psychiatric:         Mood and Affect: Mood is anxious.         Thought Content: Thought content normal.         Judgment: Judgment normal.          Labs:  CBC:   Recent Labs     04/09/25  1711 04/10/25  0445 04/11/25  0450   WBC 7.0 4.2 4.2   HGB 14.9 12.4 12.4   HCT 43.1 36.8 37.5    278 268       BMP:   Recent Labs     04/09/25  1928 04/10/25  0445 04/11/25  0450    140 140   K 4.1 4.1 3.9    106 106   CO2 21 25 24   BUN 16 11 14   CREATININE 0.7 0.8 0.7   GLUCOSE 93 106* 93   PHOS 2.9 3.5 4.0     Magnesium:   Recent Labs     04/10/25  0445 04/11/25  0450   MG 2.18 2.12     LFT's:   Recent Labs     04/09/25  1711   AST 24   ALT 14   BILITOT 0.5    - Upon discharge:home     Will discuss with attending physician Aparna Hamm MD     _____________________  Sherrill Crain MD   Internal Medicine Resident, PGY-2

## 2025-04-11 NOTE — DISCHARGE INSTRUCTIONS
1. You were seen in the hospital for possible interstitial cystitis.    2. Please make an appointment to see your primary care doctor in 1 week. Please make an appointment to see your urologist as scheduled on Monday.     3. START taking Gemtesa. If it is too expensive, please let the urology group note.    STOP Prozac. START Elavil 1 tablet once a day; start taking it on 4/12.     I have prescribed a medication called atarax. You can take one pill up to three times a day for anxiety.     4. I recommend you try to find a pelvic floor therapist.           Psychotherapy resources    Psychotherapy Resources:  [email or web address]  Accepts most insurances  Many locations  [phone]  [email or web address]  Recommend scheduling online because telephone can have long wait times  Psychiatry and therapy    [facility]  Mainly virtual/telehealth visits  Accepts many insurances  Submit request online to initiate services  [email or web address]  Psychiatry and therapy    [facility]  In-person or telehealth  Locations in [place], [place], etc.  [phone]  [email or web address]"  Psychiatry and therapy    [facility]  Accepts many private insurances, but no Medicaid   Two locations, possibly now three locations  [phone]  [email or web address]"    [facility]  Private insurances and some Medicaid  [address] [place] [address]  [phone]  [email or web address]  [email or web address]."[employer]"    [facility]  Accept many private insurance plans  [facility], and [facility] offices  [phone]  [email or web address]."[employer]"/  Individual, couples, family    This is the book that we discussed [employer] by Rosaline Julio  [email or web address]

## 2025-05-02 ENCOUNTER — TELEPHONE (OUTPATIENT)
Dept: OBGYN CLINIC | Age: 50
End: 2025-05-02

## 2025-05-02 RX ORDER — HYDROXYZINE HYDROCHLORIDE 25 MG/1
TABLET, FILM COATED ORAL
Qty: 30 TABLET | Refills: 0 | OUTPATIENT
Start: 2025-05-02

## 2025-05-02 NOTE — TELEPHONE ENCOUNTER
[facility]- D/T US availability US will need to be r/s to outpatient hospital US. Once she has outpatient US scheduled we can schedule f/u with provider.     If pt needs central scheduling # it is [phone].     Please sign outpatient order.

## 2025-05-05 RX ORDER — AMITRIPTYLINE HYDROCHLORIDE 10 MG/1
TABLET ORAL
Qty: 90 TABLET | Refills: 1 | OUTPATIENT
Start: 2025-05-05

## 2025-05-09 PROBLEM — N39.0 UTI (URINARY TRACT INFECTION): Status: RESOLVED | Noted: 2025-04-09 | Resolved: 2025-05-09

## 2025-05-14 ENCOUNTER — PATIENT MESSAGE (OUTPATIENT)
Dept: OBGYN CLINIC | Age: 50
End: 2025-05-14

## 2025-05-21 ENCOUNTER — OFFICE VISIT (OUTPATIENT)
Dept: OBGYN CLINIC | Age: 50
End: 2025-05-21
Payer: COMMERCIAL

## 2025-05-21 VITALS
WEIGHT: 129.2 LBS | SYSTOLIC BLOOD PRESSURE: 115 MMHG | DIASTOLIC BLOOD PRESSURE: 75 MMHG | HEART RATE: 95 BPM | OXYGEN SATURATION: 98 % | BODY MASS INDEX: 20.76 KG/M2 | HEIGHT: 66 IN

## 2025-05-21 DIAGNOSIS — N95.1 MENOPAUSAL SYMPTOMS: Primary | ICD-10-CM

## 2025-05-21 PROCEDURE — 99213 OFFICE O/P EST LOW 20 MIN: CPT | Performed by: OBSTETRICS & GYNECOLOGY

## 2025-05-21 RX ORDER — PROGESTERONE 200 MG/1
200 CAPSULE ORAL NIGHTLY
Qty: 12 CAPSULE | Refills: 2 | Status: SHIPPED | OUTPATIENT
Start: 2025-05-21

## 2025-05-21 RX ORDER — ESTRADIOL 0.05 MG/D
1 PATCH TRANSDERMAL WEEKLY
Qty: 4 PATCH | Refills: 3 | Status: SHIPPED | OUTPATIENT
Start: 2025-05-21

## 2025-05-21 NOTE — PROGRESS NOTES
[facility]     CC:   Chief Complaint   Patient presents with    Follow-up     Follow up HRT discussion       HPI:  49 y.o.  presents with:   49 year old perimenopausal female  LMP 10/2024 presents with complaints of menopausal symptoms. Patient reports hot flashes as well as night sweats. She also reports brain fog and some joint pain.        Medications:  Current Outpatient Medications   Medication Sig Dispense Refill    estradiol (CLIMARA) 0.05 MG/24HR Place 1 patch onto the skin once a week 4 patch 3    progesterone (PROMETRIUM) 200 MG CAPS capsule Take 1 capsule by mouth nightly Please take first 12 days of month. 12 capsule 2    lidocaine 4 % external patch Place 1 patch onto the skin daily as needed (for abdominal pain) 10 each 0    amitriptyline (ELAVIL) 10 MG tablet Take 1 tablet by mouth nightly START on  evening 30 tablet 0    vibegron (GEMTESA) 75 MG TABS tablet Take 1 tablet by mouth daily 30 tablet 0    phenazopyridine (PYRIDIUM) 100 MG tablet Take 1 tablet by mouth 3 times daily as needed for Pain 21 tablet 0    estradiol (ESTRACE VAGINAL) 0.1 MG/GM vaginal cream Patient to use 2 grams daily for 2 weeks.    Then patient to taper to 1 gram 3 days per week. 42.5 g 1    estrogens conjugated (PREMARIN) 0.625 MG/GM CREA vaginal cream Place 0.5 g vaginally daily 30 g 2    levocetirizine (XYZAL ALLERGY 24HR) 5 MG tablet Take 0.5 tablets by mouth nightly       No current facility-administered medications for this visit.       Allergies:  Allergies   Allergen Reactions    Codeine Other (See Comments)    Tetracycline Diarrhea, Hives and Nausea And Vomiting       ROS:  Review of Systems     Physical Exam:  Physical Exam  Vitals and nursing note reviewed.   Constitutional:       Appearance: Normal appearance.   HENT:      Head: Normocephalic and atraumatic.   Eyes:      General:         Right eye: No discharge.         Left eye: No discharge.      Conjunctiva/sclera: Conjunctivae normal.

## 2025-05-23 ENCOUNTER — PATIENT MESSAGE (OUTPATIENT)
Dept: OBGYN CLINIC | Age: 50
End: 2025-05-23

## 2025-05-23 NOTE — TELEPHONE ENCOUNTER
Yes would recommend continuing the vaginal estrogen.  This helps more with vaginal dryness.  Please let me know if she has any further questions.  Sorry for the confusion.

## 2025-05-28 ENCOUNTER — OFFICE VISIT (OUTPATIENT)
Dept: OBGYN CLINIC | Age: 50
End: 2025-05-28
Payer: COMMERCIAL

## 2025-05-28 VITALS
SYSTOLIC BLOOD PRESSURE: 108 MMHG | HEART RATE: 76 BPM | BODY MASS INDEX: 20.89 KG/M2 | WEIGHT: 129.4 LBS | DIASTOLIC BLOOD PRESSURE: 72 MMHG | OXYGEN SATURATION: 98 %

## 2025-05-28 DIAGNOSIS — N84.0 ENDOMETRIAL POLYP: Primary | ICD-10-CM

## 2025-05-28 PROCEDURE — 99213 OFFICE O/P EST LOW 20 MIN: CPT | Performed by: OBSTETRICS & GYNECOLOGY

## 2025-05-28 NOTE — PROGRESS NOTES
[facility]     CC:   Chief Complaint   Patient presents with    Follow-up     Follow up with US         HPI:  49 y.o.  presents with:   49 year old female  LMP 10/1/2024 for follow up US. Patient previously had a TVUS that revealed likely polyp.  She denies any issues with bleeding at this time.  She did previously have issues with UTI.  She was recently placed on hormone replacement therapy for management of her vasomotor symptoms.       Medications:  Current Outpatient Medications   Medication Sig Dispense Refill    estradiol (CLIMARA) 0.05 MG/24HR Place 1 patch onto the skin once a week 4 patch 3    progesterone (PROMETRIUM) 200 MG CAPS capsule Take 1 capsule by mouth nightly Please take first 12 days of month. 12 capsule 2    lidocaine 4 % external patch Place 1 patch onto the skin daily as needed (for abdominal pain) 10 each 0    amitriptyline (ELAVIL) 10 MG tablet Take 1 tablet by mouth nightly START on  evening 30 tablet 0    vibegron (GEMTESA) 75 MG TABS tablet Take 1 tablet by mouth daily 30 tablet 0    phenazopyridine (PYRIDIUM) 100 MG tablet Take 1 tablet by mouth 3 times daily as needed for Pain 21 tablet 0    estradiol (ESTRACE VAGINAL) 0.1 MG/GM vaginal cream Patient to use 2 grams daily for 2 weeks.    Then patient to taper to 1 gram 3 days per week. 42.5 g 1    estrogens conjugated (PREMARIN) 0.625 MG/GM CREA vaginal cream Place 0.5 g vaginally daily 30 g 2    levocetirizine (XYZAL ALLERGY 24HR) 5 MG tablet Take 0.5 tablets by mouth nightly       No current facility-administered medications for this visit.       Allergies:  Allergies   Allergen Reactions    Codeine Other (See Comments)    Tetracycline Diarrhea, Hives and Nausea And Vomiting       ROS:  Review of Systems   Genitourinary:  Negative for menstrual problem.        Physical Exam:  Physical Exam  Vitals and nursing note reviewed.   Constitutional:       Appearance: Normal appearance.   HENT:      Head: Normocephalic

## 2025-06-03 RX ORDER — AMITRIPTYLINE HYDROCHLORIDE 10 MG/1
TABLET ORAL
Qty: 30 TABLET | Refills: 0 | OUTPATIENT
Start: 2025-06-03

## 2025-06-05 RX ORDER — VIBEGRON 75 MG/1
75 TABLET, FILM COATED ORAL DAILY
Qty: 30 TABLET | Refills: 0 | OUTPATIENT
Start: 2025-06-05

## 2025-06-05 RX ORDER — ESTRADIOL 0.1 MG/G
CREAM VAGINAL
Qty: 42.5 G | Refills: 1 | Status: SHIPPED | OUTPATIENT
Start: 2025-06-05

## 2025-06-18 ENCOUNTER — PATIENT MESSAGE (OUTPATIENT)
Dept: OBGYN CLINIC | Age: 50
End: 2025-06-18

## 2025-06-19 RX ORDER — PROGESTERONE 100 MG/1
100 CAPSULE ORAL DAILY
Qty: 30 CAPSULE | Refills: 3 | Status: SHIPPED | OUTPATIENT
Start: 2025-06-19

## 2025-07-22 ENCOUNTER — PATIENT MESSAGE (OUTPATIENT)
Dept: OBGYN CLINIC | Age: 50
End: 2025-07-22

## 2025-07-22 RX ORDER — ESTRADIOL 0.1 MG/G
CREAM VAGINAL
Qty: 42.5 G | Refills: 1 | Status: SHIPPED | OUTPATIENT
Start: 2025-07-22 | End: 2025-07-23 | Stop reason: SDUPTHER

## 2025-07-22 RX ORDER — ESTRADIOL 0.05 MG/D
1 PATCH TRANSDERMAL WEEKLY
Qty: 4 PATCH | Refills: 3 | Status: SHIPPED | OUTPATIENT
Start: 2025-07-22 | End: 2025-07-23 | Stop reason: SDUPTHER

## 2025-07-22 RX ORDER — CONJUGATED ESTROGENS 0.62 MG/G
0.5 CREAM VAGINAL DAILY
Qty: 30 G | Refills: 2 | Status: SHIPPED | OUTPATIENT
Start: 2025-07-22 | End: 2025-07-23 | Stop reason: CLARIF

## 2025-07-22 NOTE — TELEPHONE ENCOUNTER
Patient allergic to Cipro will need to check with Dr Neri about  Antibiotic for surgery   Pended, please REVIEW and sign or advise

## 2025-07-23 RX ORDER — ESTRADIOL 0.05 MG/D
1 PATCH TRANSDERMAL WEEKLY
Qty: 4 PATCH | Refills: 3 | OUTPATIENT
Start: 2025-07-23

## 2025-07-23 RX ORDER — PROGESTERONE 100 MG/1
100 CAPSULE ORAL DAILY
Qty: 30 CAPSULE | Refills: 3 | OUTPATIENT
Start: 2025-07-23

## 2025-07-23 RX ORDER — ESTRADIOL 0.1 MG/G
CREAM VAGINAL
Qty: 42.5 G | Refills: 1 | OUTPATIENT
Start: 2025-07-23

## 2025-07-23 NOTE — TELEPHONE ENCOUNTER
Pt called in and clarified it is NOT the premarin she needs sent in. She needs progesterone, estrogen patches, and estrogen cream. Pended orders to correct pharmacy. Please sign or advise.

## 2025-08-29 ENCOUNTER — PATIENT MESSAGE (OUTPATIENT)
Dept: OBGYN CLINIC | Age: 50
End: 2025-08-29

## 2025-09-02 RX ORDER — ESTRADIOL 0.05 MG/D
1 PATCH TRANSDERMAL WEEKLY
Qty: 4 PATCH | Refills: 3 | Status: SHIPPED | OUTPATIENT
Start: 2025-09-02